# Patient Record
Sex: MALE | Race: WHITE | NOT HISPANIC OR LATINO | Employment: FULL TIME | URBAN - METROPOLITAN AREA
[De-identification: names, ages, dates, MRNs, and addresses within clinical notes are randomized per-mention and may not be internally consistent; named-entity substitution may affect disease eponyms.]

---

## 2017-03-14 ENCOUNTER — OFFICE VISIT (OUTPATIENT)
Dept: URGENT CARE | Facility: RETAIL CLINIC | Age: 37
End: 2017-03-14
Payer: COMMERCIAL

## 2017-03-14 DIAGNOSIS — B07.8 COMMON WART: Primary | ICD-10-CM

## 2017-03-14 PROCEDURE — 99202 OFFICE O/P NEW SF 15 MIN: CPT | Performed by: NURSE PRACTITIONER

## 2017-03-14 PROCEDURE — 17110 DESTRUCTION B9 LES UP TO 14: CPT | Performed by: NURSE PRACTITIONER

## 2017-03-14 NOTE — PROGRESS NOTES
SUBJECTIVE:  Reji Choi is a 36 year old male who presents today for wart treatment.  The patient has had 1 wart(s) for 2 month(s) and has tried over-the counter anti-wart medications.  There is no history of infection or injury.  This is the patient's first treatment.    OBJECTIVE:  The patient appears today in no apparent distress.  Vitals as above.  Skin: A non-erythematous, raised papule with pinpoint hemmorhages measuring 0.5 cm is seen on the foot, left.  A few smaller satellite papules are also noted.    ASSESSMENT: Common Wart(s).    PLAN:  Each wart was frozen easily three times with liquid nitrogen.  Each wart was pared with a #15 blade.  A total of 1 warts are treated today.  The etiology of common warts were discussed.  .name will continue to use the over-the-counter medications such as Compound W on a nightly basis.  Warm soapy water soaks and sanding also recommended.  The patient is to return every two weeks until all warts have been removed.    Trey FORBES, MSN, Family NP-C  [facility]

## 2017-03-14 NOTE — NURSING NOTE
"Chief Complaint   Patient presents with     Wart     left foot for a couple of months       Initial There were no vitals taken for this visit. Estimated body mass index is 29.22 kg/(m^2) as calculated from the following:    Height as of 3/16/12: 5' 10.75\" (1.797 m).    Weight as of 3/16/12: 208 lb (94.3 kg).  Medication Reconciliation: complete   Winnie Cole      "

## 2017-03-14 NOTE — MR AVS SNAPSHOT
"              After Visit Summary   3/14/2017    Reji Choi    MRN: [id number]           Patient Information     Date Of Birth          1980        Visit Information        Provider Department      3/14/2017 5:30 PM Trey Pacheco APRN [facility]        Today's Diagnoses     Common wart    -  1       Follow-ups after your visit        Who to contact     You can reach your care team any time of the day by calling [phone].  Notification of test results:  If you have an abnormal lab result, we will notify you by phone as soon as possible.         Additional Information About Your Visit        MyChart Information     [employer] lets you send messages to your doctor, view your test results, renew your prescriptions, schedule appointments and more. To sign up, go to [email or web address] . Click on \"Log in\" on the left side of the screen, which will take you to the Welcome page. Then click on \"Sign up Now\" on the right side of the page.     You will be asked to enter the access code listed below, as well as some personal information. Please follow the directions to create your username and password.     Your access code is: [email or web address]  Expires: 2017  6:50 PM     Your access code will  in 90 days. If you need help or a new code, please call your [place] clinic or [phone].        Care EveryWhere ID     This is your Care EveryWhere ID. This could be used by other organizations to access your [facility] medical records  [id number]         Blood Pressure from Last 3 Encounters:   10/06/12 145/89   12 132/87   12/15/11 146/92    Weight from Last 3 Encounters:   12 208 lb (94.3 kg)   08 189 lb 9.6 oz (86 kg)   06 183 lb 8 oz (83.2 kg)              Today, you had the following     No orders found for display       Primary Care Provider Office Phone # Fax #    Pedro Barajas [phone]       [facility] [address] [place] [address]        Thank you!     Thank you for choosing [facility]  for your care. Our goal is always to provide you with excellent care. Hearing back from our patients is one way we can continue to improve our services. Please take a few minutes to complete the written survey that you may receive in the mail after your visit with us. Thank you!             Your Updated Medication List - Protect others around you: Learn how to safely use, store and throw away your medicines at www.disposemymeds.org.          This list is accurate as of: 3/14/17  6:50 PM.  Always use your most recent med list.                   Brand Name Dispense Instructions for use    amitriptyline 25 MG tablet    ELAVIL     1 tablet at night       ATORVASTATIN CALCIUM PO          DIOVAN 40 MG tablet   Generic drug:  valsartan      Take 40 mg by mouth daily.       METOPROLOL TARTRATE PO          PRILOSEC PO      1 tablet daily

## 2019-12-03 ENCOUNTER — OFFICE VISIT (OUTPATIENT)
Dept: URGENT CARE | Facility: RETAIL CLINIC | Age: 39
End: 2019-12-03
Payer: COMMERCIAL

## 2019-12-03 VITALS
DIASTOLIC BLOOD PRESSURE: 91 MMHG | HEART RATE: 75 BPM | SYSTOLIC BLOOD PRESSURE: 142 MMHG | TEMPERATURE: 98.2 F | OXYGEN SATURATION: 100 %

## 2019-12-03 DIAGNOSIS — H65.91 OME (OTITIS MEDIA WITH EFFUSION), RIGHT: Primary | ICD-10-CM

## 2019-12-03 DIAGNOSIS — J01.40 ACUTE NON-RECURRENT PANSINUSITIS: ICD-10-CM

## 2019-12-03 PROBLEM — Z79.899 CONTROLLED SUBSTANCE AGREEMENT SIGNED: Status: ACTIVE | Noted: 2018-02-01

## 2019-12-03 PROBLEM — G89.29 CHRONIC NECK PAIN: Status: ACTIVE | Noted: 2017-10-03

## 2019-12-03 PROBLEM — K82.8 BILIARY DYSKINESIA: Status: ACTIVE | Noted: 2017-08-29

## 2019-12-03 PROBLEM — M54.2 CHRONIC NECK PAIN: Status: ACTIVE | Noted: 2017-10-03

## 2019-12-03 PROCEDURE — 99213 OFFICE O/P EST LOW 20 MIN: CPT | Performed by: NURSE PRACTITIONER

## 2019-12-03 RX ORDER — DULOXETIN HYDROCHLORIDE 60 MG/1
60 CAPSULE, DELAYED RELEASE ORAL DAILY
COMMUNITY
End: 2021-08-16

## 2019-12-03 RX ORDER — CHOLESTYRAMINE LIGHT 4 G/5.7G
POWDER, FOR SUSPENSION ORAL
COMMUNITY
Start: 2018-06-11 | End: 2021-08-16 | Stop reason: ALTCHOICE

## 2019-12-03 RX ORDER — LOSARTAN POTASSIUM 100 MG/1
100 TABLET ORAL DAILY
COMMUNITY
Start: 2018-09-14

## 2019-12-03 ASSESSMENT — ENCOUNTER SYMPTOMS
SHORTNESS OF BREATH: 0
COUGH: 0
DIAPHORESIS: 0
WHEEZING: 0
CHEST TIGHTNESS: 0
FATIGUE: 0
FEVER: 0
SORE THROAT: 0
DIZZINESS: 0
SINUS PRESSURE: 1
SLEEP DISTURBANCE: 0
ADENOPATHY: 0
CHILLS: 0
HEADACHES: 1
APPETITE CHANGE: 0
LIGHT-HEADEDNESS: 1
SINUS PAIN: 1
WEAKNESS: 0

## 2019-12-03 NOTE — PATIENT INSTRUCTIONS
Augmentin for right ear infection / perforation and sinusitis.  Tylenol and/or motrin for pain relief and fever reduction.  Warm compresses next to ear for pain relief.  Drink plenty of fluids and place a humidifier in bedroom.  Mucinex to help reduce fluid in ears (guiafenasin is the generic).  Ear infections are not contagious.  Flonase (fluticasone) 2 sprays in each nostril daily until symptoms resolve, then continue 1 spray in each nostril for at least 5 more days.  May use netti pot with bottled or distilled water and saline packets to flush sinuses.  Saline drops or nasal sprays may loosen mucus.  Sit in the bathroom with the door closed and hot shower running to loosen mucus.  Contact primary care clinic if you do not have any relief from your symptoms after 10 days.  Present to emergency room for significantly increasing pain, persistent high fever >102F, swelling/redness around your eyes, changes in your vision or ability to move your eyes, altered mental status or a severe headache.

## 2019-12-03 NOTE — PROGRESS NOTES
Chief Complaint   Patient presents with     Sinus Problem     x a week     Ear Problem     off and on in both ears all week, right ear got worse last night     SUBJECTIVE:  Reji Choi is a 39 year old male who presents for evaluation of sinusitis, facial pain, headache, dizziness, bilateral ear pain, fullness for 1 week(s). Right ear drained thin watery clear yellow yesterday.  Severity: moderate   Timing: gradual onset and worsening  Treatment measures tried include: Tylenol/Ibuprofen.  History of recurrent otitis: no  Predisposing factors include: None.    No past medical history on file.  AMITRIPTYLINE HCL 25 MG OR TABS, 1 tablet at night  ATORVASTATIN CALCIUM PO,   cholestyramine light (PREVALITE) 4 GM powder,   DULoxetine (CYMBALTA) 60 MG capsule, Take 60 mg by mouth daily  losartan (COZAAR) 100 MG tablet, Take 100 mg by mouth  METOPROLOL TARTRATE PO,   omeprazole (PRILOSEC) 20 MG DR capsule,   valsartan (DIOVAN) 40 MG tablet, Take 40 mg by mouth daily.    No current facility-administered medications on file prior to visit.     Social History     Tobacco Use     Smoking status: Current Every Day Smoker     Tobacco comment: eight months ago   Substance Use Topics     Alcohol use: Not on file     Allergies   Allergen Reactions     Clindamycin      Review of Systems   Constitutional: Negative for appetite change, chills, diaphoresis, fatigue and fever.   HENT: Positive for congestion, ear discharge, ear pain, sinus pressure and sinus pain. Negative for postnasal drip and sore throat.    Respiratory: Negative for cough, chest tightness, shortness of breath and wheezing.    Skin: Negative for pallor and rash.   Neurological: Positive for light-headedness and headaches. Negative for dizziness and weakness.   Hematological: Negative for adenopathy.   Psychiatric/Behavioral: Negative for sleep disturbance.     OBJECTIVE:  BP (!) 142/91   Pulse 75   Temp 98.2  F (36.8  C) (Oral)   SpO2 100%      Physical  Exam  Vitals signs reviewed.   Constitutional:       Appearance: Normal appearance.   HENT:      Head: Normocephalic and atraumatic.      Left Ear: Tympanic membrane and ear canal normal.      Ears:      Comments: Right tympanic membrane bulging erythematous, cannot see perforation. Suspect minor perf with thin clear yellow drainage yesterday.     Nose: Congestion present.      Comments: pansinus tenderness  Cardiovascular:      Rate and Rhythm: Normal rate.   Pulmonary:      Effort: Pulmonary effort is normal.   Skin:     General: Skin is warm and dry.   Neurological:      General: No focal deficit present.      Mental Status: He is alert and oriented to person, place, and time.   Psychiatric:         Mood and Affect: Mood normal.         Behavior: Behavior normal.       ASSESSMENT:    ICD-10-CM    1. OME (otitis media with effusion), right H65.91 amoxicillin-clavulanate (AUGMENTIN) 875-125 MG tablet   2. Acute non-recurrent pansinusitis J01.40 amoxicillin-clavulanate (AUGMENTIN) 875-125 MG tablet     PLAN:   Patient Instructions   Augmentin for right ear infection / perforation and sinusitis.  Tylenol and/or motrin for pain relief and fever reduction.  Warm compresses next to ear for pain relief.  Drink plenty of fluids and place a humidifier in bedroom.  Mucinex to help reduce fluid in ears (guiafenasin is the generic).  Ear infections are not contagious.  Flonase (fluticasone) 2 sprays in each nostril daily until symptoms resolve, then continue 1 spray in each nostril for at least 5 more days.  May use netti pot with bottled or distilled water and saline packets to flush sinuses.  Saline drops or nasal sprays may loosen mucus.  Sit in the bathroom with the door closed and hot shower running to loosen mucus.  Contact primary care clinic if you do not have any relief from your symptoms after 10 days.  Present to emergency room for significantly increasing pain, persistent high fever >102F, swelling/redness around  your eyes, changes in your vision or ability to move your eyes, altered mental status or a severe headache.    Follow up with primary care provider with any problems, questions or concerns or if symptoms worsen or fail to improve. Patient agreed to plan and verbalized understanding.    LIS Townsend-BC  [facility] [place]

## 2020-01-28 ENCOUNTER — OFFICE VISIT (OUTPATIENT)
Dept: URGENT CARE | Facility: RETAIL CLINIC | Age: 40
End: 2020-01-28
Payer: COMMERCIAL

## 2020-01-28 ENCOUNTER — TELEPHONE (OUTPATIENT)
Dept: FAMILY MEDICINE | Facility: CLINIC | Age: 40
End: 2020-01-28

## 2020-01-28 VITALS
DIASTOLIC BLOOD PRESSURE: 69 MMHG | SYSTOLIC BLOOD PRESSURE: 129 MMHG | OXYGEN SATURATION: 97 % | HEART RATE: 77 BPM | TEMPERATURE: 98.7 F

## 2020-01-28 DIAGNOSIS — H65.93 OME (OTITIS MEDIA WITH EFFUSION), BILATERAL: Primary | ICD-10-CM

## 2020-01-28 DIAGNOSIS — R05.9 COUGH: ICD-10-CM

## 2020-01-28 LAB
FLUAV AG UPPER RESP QL IA.RAPID: NORMAL
FLUBV AG UPPER RESP QL IA.RAPID: NORMAL

## 2020-01-28 PROCEDURE — 99213 OFFICE O/P EST LOW 20 MIN: CPT | Performed by: NURSE PRACTITIONER

## 2020-01-28 PROCEDURE — 87804 INFLUENZA ASSAY W/OPTIC: CPT | Mod: QW | Performed by: NURSE PRACTITIONER

## 2020-01-28 RX ORDER — CEFDINIR 300 MG/1
300 CAPSULE ORAL 2 TIMES DAILY
Qty: 14 CAPSULE | Refills: 0 | Status: SHIPPED | OUTPATIENT
Start: 2020-01-28 | End: 2020-02-04

## 2020-01-28 ASSESSMENT — ENCOUNTER SYMPTOMS
WHEEZING: 0
EYE PAIN: 0
COUGH: 1
EYE ITCHING: 0
STRIDOR: 0
NAUSEA: 1
FEVER: 1
MYALGIAS: 1
HEADACHES: 1
WEAKNESS: 0
PHOTOPHOBIA: 0
EYE REDNESS: 0
ABDOMINAL PAIN: 0
SHORTNESS OF BREATH: 0
FATIGUE: 0
DIZZINESS: 0
CHEST TIGHTNESS: 0
SLEEP DISTURBANCE: 1
CHILLS: 1
SORE THROAT: 0
APPETITE CHANGE: 0
RHINORRHEA: 0
VOMITING: 0
DIAPHORESIS: 1
EYE DISCHARGE: 0

## 2020-01-29 NOTE — PATIENT INSTRUCTIONS
Omnicef for lingering ear infections.  Antihistamines zytrec and benadryl for ear fullness.  Tylenol and/or motrin for pain relief and fever reduction.  Warm compresses next to ear for pain relief.  Drink plenty of fluids and place a humidifier in bedroom.  Ear infections are not contagious.  Swimming is ok as long as there is no perforation in the ear drum.   Follow up with primary care provider 10-14 days after starting the antibiotic with any concern of persistent infection.    Flu test was negative.  Rest! Your body needs more rest to heal.  Drink plenty of fluids (warm fluids like tea or soup are soothing and reduce cough)  Sit in the bathroom with a hot shower running and breathe in the steam.  Honey may soothe your sore throat and help manage your cough- may take straight or in warm water with lemon juice.  Avoid smoke (cigarettes, bonfires, fireplace, wood burning stoves).  Take Tylenol or an NSAID such as ibuprofen or naproxen as needed for pain.  Delsym (dextromethorphan polistirex) is an over the counter cough medication that lasts 12 hours.   Mucinex or Robitussin (guiafenesin) thin mucus and may help it to loosen more quickly  Good handwashing is the best way to prevent spread of germs  Present to emergency room if you develop trouble breathing, swallowing or cough-up blood.  Follow up with your primary care provider if symptoms worsen or fail to improve as expected.

## 2020-01-29 NOTE — PROGRESS NOTES
Chief Complaint   Patient presents with     Cough     x 5 days     Fever     SUBJECTIVE:  Reji Choi is a 39 year old male who presents to the clinic today with his girlfriend with a chief complaint of cough, fever, headache, myalgias, nausea for 5 days. Earaches for 2 months. Treated AOM with Augmentin 12/03.  His cough is described as occasional and productive of yellow sputum.  The patient's symptoms are moderate and stable.  The patient's symptoms are exacerbated by no particular triggers.  Patient has been using ibuprofen and Tylenol to improve symptoms.  Predisposing factors include: dad in the hospital with flu.    No past medical history on file.  AMITRIPTYLINE HCL 25 MG OR TABS, 1 tablet at night  ATORVASTATIN CALCIUM PO,   cholestyramine light (PREVALITE) 4 GM powder,   DULoxetine (CYMBALTA) 60 MG capsule, Take 60 mg by mouth daily  losartan (COZAAR) 100 MG tablet, Take 100 mg by mouth  METOPROLOL TARTRATE PO,   omeprazole (PRILOSEC) 20 MG DR capsule,   valsartan (DIOVAN) 40 MG tablet, Take 40 mg by mouth daily.    No current facility-administered medications on file prior to visit.     Social History     Tobacco Use     Smoking status: Current Every Day Smoker     Tobacco comment: eight months ago   Substance Use Topics     Alcohol use: Not on file     Allergies   Allergen Reactions     Clindamycin      Review of Systems   Constitutional: Positive for chills, diaphoresis and fever. Negative for appetite change and fatigue.   HENT: Positive for congestion and ear pain. Negative for ear discharge, rhinorrhea, sneezing and sore throat.    Eyes: Negative for photophobia, pain, discharge, redness, itching and visual disturbance.   Respiratory: Positive for cough. Negative for chest tightness, shortness of breath, wheezing and stridor.    Gastrointestinal: Positive for nausea. Negative for abdominal pain and vomiting.   Musculoskeletal: Positive for myalgias.   Skin: Negative for rash.    Allergic/Immunologic: Negative for environmental allergies.   Neurological: Positive for headaches. Negative for dizziness and weakness.   Psychiatric/Behavioral: Positive for sleep disturbance.     /69   Pulse 77   Temp 98.7  F (37.1  C) (Oral)   SpO2 97%     Physical Exam  Vitals signs reviewed.   Constitutional:       Appearance: Normal appearance. He is ill-appearing. He is not toxic-appearing.   HENT:      Head: Normocephalic and atraumatic.      Ears:      Comments: Bilateral tympanic membranes with serosanguinous effusions, bulging, erythema.     Nose: Nose normal.      Mouth/Throat:      Mouth: Mucous membranes are moist.      Pharynx: No oropharyngeal exudate or posterior oropharyngeal erythema.   Neck:      Musculoskeletal: Normal range of motion and neck supple.   Cardiovascular:      Rate and Rhythm: Normal rate.   Pulmonary:      Effort: Respiratory distress (occasional cough) present.      Breath sounds: Normal breath sounds. No stridor. No wheezing, rhonchi or rales.   Chest:      Chest wall: No tenderness.   Abdominal:      General: Abdomen is flat.      Palpations: Abdomen is soft.   Musculoskeletal: Normal range of motion.   Lymphadenopathy:      Cervical: No cervical adenopathy.   Skin:     General: Skin is warm and dry.      Findings: No rash.   Neurological:      General: No focal deficit present.      Mental Status: He is alert and oriented to person, place, and time.   Psychiatric:         Mood and Affect: Mood normal.         Behavior: Behavior normal.       ASSESSMENT:    ICD-10-CM    1. OME (otitis media with effusion), bilateral H65.93 cefdinir (OMNICEF) 300 MG capsule   2. Cough R05 INFLUENZA A/B ANTIGEN     PLAN:   Patient Instructions   Omnicef for lingering ear infections.  Antihistamines zytrec and benadryl for ear fullness.  Tylenol and/or motrin for pain relief and fever reduction.  Warm compresses next to ear for pain relief.  Drink plenty of fluids and place a  humidifier in bedroom.  Ear infections are not contagious.  Swimming is ok as long as there is no perforation in the ear drum.   Follow up with primary care provider 10-14 days after starting the antibiotic with any concern of persistent infection.    Flu test was negative.  Rest! Your body needs more rest to heal.  Drink plenty of fluids (warm fluids like tea or soup are soothing and reduce cough)  Sit in the bathroom with a hot shower running and breathe in the steam.  Honey may soothe your sore throat and help manage your cough- may take straight or in warm water with lemon juice.  Avoid smoke (cigarettes, bonfires, fireplace, wood burning stoves).  Take Tylenol or an NSAID such as ibuprofen or naproxen as needed for pain.  Delsym (dextromethorphan polistirex) is an over the counter cough medication that lasts 12 hours.   Mucinex or Robitussin (guiafenesin) thin mucus and may help it to loosen more quickly  Good handwashing is the best way to prevent spread of germs  Present to emergency room if you develop trouble breathing, swallowing or cough-up blood.  Follow up with your primary care provider if symptoms worsen or fail to improve as expected.    Follow up with primary care provider with any problems, questions or concerns or if symptoms worsen or fail to improve. Patient agreed to plan and verbalized understanding.    Josselin Rivera, LIS-BC  [facility] [place]

## 2021-08-04 ENCOUNTER — HOSPITAL ENCOUNTER (EMERGENCY)
Facility: CLINIC | Age: 41
Discharge: HOME | End: 2021-08-04
Attending: EMERGENCY MEDICINE | Admitting: EMERGENCY MEDICINE
Payer: COMMERCIAL

## 2021-08-04 VITALS
BODY MASS INDEX: 25.48 KG/M2 | SYSTOLIC BLOOD PRESSURE: 145 MMHG | HEART RATE: 57 BPM | OXYGEN SATURATION: 99 % | DIASTOLIC BLOOD PRESSURE: 97 MMHG | RESPIRATION RATE: 16 BRPM | WEIGHT: 181.4 LBS | TEMPERATURE: 97.9 F

## 2021-08-04 DIAGNOSIS — H92.01 OTALGIA, RIGHT: ICD-10-CM

## 2021-08-04 PROCEDURE — 99282 EMERGENCY DEPT VISIT SF MDM: CPT | Performed by: EMERGENCY MEDICINE

## 2021-08-04 PROCEDURE — 99284 EMERGENCY DEPT VISIT MOD MDM: CPT | Performed by: EMERGENCY MEDICINE

## 2021-08-04 RX ORDER — HYDROCODONE BITARTRATE AND ACETAMINOPHEN 5; 325 MG/1; MG/1
1 TABLET ORAL EVERY 4 HOURS PRN
Qty: 15 TABLET | Refills: 0 | Status: SHIPPED | OUTPATIENT
Start: 2021-08-04 | End: 2021-08-16

## 2021-08-04 NOTE — DISCHARGE INSTRUCTIONS
Use Zyrtec for allergies.  Follow-up with ENT as directed.  I put in a referral for you.  Return to the emergency department if you develop new or worsening symptoms.  There was no infection there but there appeared to be a large amount of effusion or fluid in the middle ear.  You may need to have an ear tube.  He will be able to discuss this possibility with ENT.  Do not drive if taking the pain pills.

## 2021-08-04 NOTE — ED PROVIDER NOTES
History     Chief Complaint   Patient presents with     Otalgia     HPI  Reji Choi is a 41 year old male who presents to the emergency department secondary to right ear pain.  The pain started yesterday and is associated with some dizziness for a couple of weeks and drainage from his eye that started today as well.  He has not been sick recently.  He has had decreased hearing in the right ear.  He intermittently gets some mild dizziness.  He had a ruptured eardrum previously that resulted in a lot of pain and at the time his eardrum was infected.    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 No pertinent past medical history.    Past Surgical History:    Past Surgical History:   Procedure Laterality Date     CHOLECYSTECTOMY         Family History:    History reviewed. No pertinent family history.    Social History:  Marital Status:   [2]  Social History     Tobacco Use     Smoking status: Former Smoker     Smokeless tobacco: Never Used     Tobacco comment: eight months ago   Substance Use Topics     Alcohol use: Yes     Comment: occ     Drug use: Yes     Types: Marijuana      Comment: prescription         Medications:    HYDROcodone-acetaminophen (NORCO) 5-325 MG tablet  AMITRIPTYLINE HCL 25 MG OR TABS  ATORVASTATIN CALCIUM PO  cholestyramine light (PREVALITE) 4 GM powder  DULoxetine (CYMBALTA) 60 MG capsule  losartan (COZAAR) 100 MG tablet  METOPROLOL TARTRATE PO  omeprazole (PRILOSEC) 20 MG DR capsule  valsartan (DIOVAN) 40 MG tablet          Review of Systems   All other systems reviewed and are negative.      Physical Exam   BP: (!) 145/97  Pulse: 57  Temp: 97.9  F (36.6  C)  Resp: 16  Weight: 82.3 kg (181 lb 6.4 oz)  SpO2: 99 %      Physical Exam  Vitals and nursing note reviewed.   Constitutional:       General: He is not in acute distress.     Appearance: He is well-developed. He is not diaphoretic.   HENT:      Head: Normocephalic and atraumatic.      Right Ear: Ear canal and external ear normal. There is no impacted cerumen.      Left Ear: Ear canal and external ear normal. There is no impacted cerumen.      Ears:      Comments: Middle ear effusion on the right with opacity of the tympanic membrane.  Left tympanic membrane appears normal.  No erythema of either tympanic membrane.     Nose: Nose normal.      Mouth/Throat:      Mouth: Mucous membranes are moist.      Pharynx: Oropharynx is clear. No oropharyngeal exudate or posterior oropharyngeal erythema.   Eyes:      General: No scleral icterus.     Extraocular Movements: Extraocular movements intact.      Conjunctiva/sclera: Conjunctivae normal.      Pupils: Pupils are equal, round, and reactive to light.   Cardiovascular:      Rate and Rhythm: Normal rate.   Pulmonary:      Effort: Pulmonary effort is normal.   Musculoskeletal:         General: Normal range of motion.      Cervical back: Normal range of motion and neck supple.   Skin:     General: Skin is warm and dry.      Findings: No rash.   Neurological:      Mental Status: He is alert and oriented to person, place, and time.   Psychiatric:         Mood and Affect: Mood  normal.         Thought Content: Thought content normal.         ED Course        Procedures                  No results found for this or any previous visit (from the past 24 hour(s)).    Medications - No data to display    Assessments & Plan (with Medical Decision Making)  Middle ear effusion, right ear in the setting of previous history of tympanic membrane rupture.  This is probably the cause of his dizziness and decreased hearing.  I have referred him to ENT.  I am not sure if he would benefit from a PE tube on that side.  Return to ER precautions and follow-up precautions discussed.  He can take Zyrtec for allergies and this may help.  He cannot use Flonase as he has a sensitivity to that.     I have reviewed the nursing notes.    I have reviewed the findings, diagnosis, plan and need for follow up with the patient.      New Prescriptions    HYDROCODONE-ACETAMINOPHEN (NORCO) 5-325 MG TABLET    Take 1 tablet by mouth every 4 hours as needed for pain       Final diagnoses:   Otalgia, right       8/4/2021   [facility] EMERGENCY DEPT     Raul Woodward MD  08/04/21 [id number]

## 2021-08-04 NOTE — ED TRIAGE NOTES
Pt having pain in the right ear since yesterday, reports some dizziness for a couple of weeks and drainage from his eye today as well

## 2021-08-10 NOTE — PROGRESS NOTES
ENT Consultation    Reji Choi who is a 41 year old male seen in consultation at the request of Raul Woodward.      History of Present Illness - Reji Choi is a 41 year old male who presents at the request of emergency department.  He presents with pain in his right ear.  Actually he has had discomfort in both ears for at least few weeks.  This particular pain is just much more excruciating on the right side associated with some dizziness disequilibrium.  He says both ears feel somewhat plugged more pain on the right side.  He also gets pain from his neck to the back of the head.  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  Has not had it in a while.  And his dizziness appears to be more lightheadedness rather than vertigo.  He says he does better in the light environment rather than dark environment.  Patient does wear oral appliance at night.  Of interest is that the problems exacerbated after he got new appliance.  He wears appliance because of bruxism.    Body mass index is 25.33 kg/m .    Weight management plan: Patient was referred to their PCP to discuss a diet and exercise plan.    BP Readings from Last 1 Encounters:   08/16/21 (!) 140/86       BP noted to be elevated today in office.  Patient to follow up with Primary Care provider regarding elevated blood pressure.    Reji IS NOT a smoker/uses chewing tobacco.    Past Medical History - History reviewed. No pertinent past medical history.    Current Medications -   Current Outpatient Medications:      desonide (DESOWEN) 0.05 % external cream, , Disp: , Rfl:      escitalopram (LEXAPRO) 20 MG tablet, Take 20 mg by mouth, Disp: , Rfl:      losartan (COZAAR) 100 MG tablet, Take 100 mg by mouth, Disp: , Rfl:      METOPROLOL TARTRATE PO, Take 200 mg by mouth , Disp: , Rfl:      omeprazole (PRILOSEC) 20 MG DR capsule, , Disp: , Rfl:      colestipol (COLESTID) 1 g tablet, TAKE 2 TABLETS BY MOUTH TWICE A DAY, Disp: ,  Rfl:      MELATONIN TR PO, , Disp: , Rfl:     Allergies -   Allergies   Allergen Reactions     Clindamycin      Pravastatin Other (See Comments)     Rosuvastatin Muscle Pain (Myalgia)     Simvastatin Muscle Pain (Myalgia)       Social History -   Social History     Socioeconomic History     Marital status:      Spouse name: Not on file     Number of children: Not on file     Years of education: Not on file     Highest education level: Not on file   Occupational History     Not on file   Tobacco Use     Smoking status: Former Smoker     Smokeless tobacco: Never Used     Tobacco comment: eight months ago   Substance and Sexual Activity     Alcohol use: Yes     Comment: occ     Drug use: Yes     Types: Marijuana     Comment: prescription      Sexual activity: Not Currently   Other Topics Concern     Not on file   Social History Narrative     Not on file     Social Determinants of Health     Financial Resource Strain:      Difficulty of Paying Living Expenses:    Food Insecurity:      Worried About Running Out of Food in the Last Year:      Ran Out of Food in the Last Year:    Transportation Needs:      Lack of Transportation (Medical):      Lack of Transportation (Non-Medical):    Physical Activity:      Days of Exercise per Week:      Minutes of Exercise per Session:    Stress:      Feeling of Stress :    Social Connections:      Frequency of Communication with Friends and Family:      Frequency of Social Gatherings with Friends and Family:      Attends Spiritism Services:      Active Member of Clubs or Organizations:      Attends Club or Organization Meetings:      Marital Status:    Intimate Partner Violence:      Fear of Current or Ex-Partner:      Emotionally Abused:      Physically Abused:      Sexually Abused:        Family History - History reviewed. No pertinent family history.    Review of Systems - As per HPI and PMHx, otherwise review of system review of the head and neck negative. Otherwise 10+  "review of system is negative    Physical Exam  BP (!) 140/86   Temp 97.7  F (36.5  C) (Temporal)   Ht 1.778 m (5' 10\")   Wt 80.1 kg (176 lb 8 oz)   BMI 25.33 kg/m    BMI: Body mass index is 25.33 kg/m .    General - The patient is well nourished and well developed, and appears to have good nutritional status.  Alert and oriented to person and place, answers questions and cooperates with examination appropriately.    SKIN - No suspicious lesions or rashes.  Respiration - No respiratory distress.  Head and Face - Normocephalic and atraumatic, with no gross asymmetry noted of the contour of the facial features.  The facial nerve is intact, with strong symmetric movements.    Voice and Breathing - The patient was breathing comfortably without the use of accessory muscles. The patients voice was clear and strong, and had appropriate pitch and quality.    Ears - Bilateral pinna and EACs with normal appearing overlying skin. Tympanic membrane intact with good mobility on pneumatic otoscopy bilaterally. Bony landmarks of the ossicular chain are normal. The tympanic membranes are normal in appearance. No retraction, perforation, or masses.  No fluid or purulence was seen in the external canal or the middle ear.     Eyes - Extraocular movements intact.  Sclera were not icteric or injected, conjunctiva were pink and moist.    Mouth - Examination of the oral cavity showed pink, healthy oral mucosa. No lesions or ulcerations noted.  The tongue was mobile and midline, and the dentition were in fair condition.  There were signs of bruxism with erosion of the enamel of the lower front teeth.    Throat - The walls of the oropharynx were smooth, pink, moist, symmetric, and had no lesions or ulcerations.  The tonsillar pillars and soft palate were symmetric.  The uvula was midline on elevation.  Tenderness palpation of the temporomandibular space on the right side.    Neck - Normal midline excursion of the laryngotracheal complex " during swallowing.  Full range of motion on passive movement.  Palpation of the occipital, submental, submandibular, internal jugular chain, and supraclavicular nodes did not demonstrate any abnormal lymph nodes or masses.  The carotid pulse was palpable bilaterally.  Palpation of the thyroid was soft and smooth, with no nodules or goiter appreciated.  The trachea was mobile and midline.  There is definite tenderness on palpation of the sternocleidomastoid on the right side.  There is definite tenderness on palpation of the TMJ area on the right side.    Nose - External contour is symmetric, no gross deflection or scars.  Nasal mucosa is pink and moist with no abnormal mucus.  The septum was midline and non-obstructive, turbinates of normal size and position.  No polyps, masses, or purulence noted on examination.    Neuro - Nonfocal neuro exam is normal, CN 2 through 12 intact, normal gait and muscle tone.      Performed in clinic today:  Attempts at mirror laryngoscopy were not possible due to gag reflex.  Therefore I proceeded with a fiberoptic examination.  First I sprayed both sides of the nose with a mixture of lidocaine and neosynephrine.  I then passed the scope through the nasal cavity.  The nasal cavity was unremarkable.  The nasopharynx was mucosally covered and symmetric.  The Eustachian tube openings were unobstructed.  Going further down I had a clear view of the base of tongue which had normal appearing lingual tonsillar tissue.  The base of tongue was free of lesions, and the vallecula was open.  The epiglottis was smooth and mucosally covered.  The supraglottic larynx was then clearly visualized.  The vocal cords moved smoothly and symmetrically, they were pearly white and no lesions were seen.  The pyriform sinuses were open, and the limited view of the postcricoid region did not show any lesions. Ambu disposable scope.      A/P - Reji Choi is a 41 year old male with right otalgia cervicalgia  history of ocular migraines disequilibrium possibly related to current inflammatory temporomandibular space issue.  At this point considering ibuprofen is not extremely helpful we advised topical heat and Medrol Dosepak will be given to reduce inflammation acutely.  He will see his orthodontist to talk about appliance and potential problems associated with it leading to some of current musculoskeletal picture.  Patient will see me back in a few weeks and audiogram will be obtained since he also complains that gradually his hearing has declined and he was experiencing intermittent tinnitus bilaterally nonpulsatile.      Derrick Valle MD

## 2021-08-16 ENCOUNTER — OFFICE VISIT (OUTPATIENT)
Dept: OTOLARYNGOLOGY | Facility: CLINIC | Age: 41
End: 2021-08-16
Attending: EMERGENCY MEDICINE
Payer: COMMERCIAL

## 2021-08-16 VITALS
TEMPERATURE: 97.7 F | SYSTOLIC BLOOD PRESSURE: 140 MMHG | WEIGHT: 176.5 LBS | BODY MASS INDEX: 25.27 KG/M2 | HEIGHT: 70 IN | DIASTOLIC BLOOD PRESSURE: 86 MMHG

## 2021-08-16 DIAGNOSIS — M26.609 TMJ (TEMPOROMANDIBULAR JOINT SYNDROME): Primary | ICD-10-CM

## 2021-08-16 DIAGNOSIS — M54.2 CERVICALGIA: ICD-10-CM

## 2021-08-16 DIAGNOSIS — H93.13 TINNITUS, BILATERAL: ICD-10-CM

## 2021-08-16 DIAGNOSIS — H92.01 OTALGIA, RIGHT: ICD-10-CM

## 2021-08-16 PROCEDURE — 99203 OFFICE O/P NEW LOW 30 MIN: CPT | Mod: 25 | Performed by: OTOLARYNGOLOGY

## 2021-08-16 PROCEDURE — 31575 DIAGNOSTIC LARYNGOSCOPY: CPT | Performed by: OTOLARYNGOLOGY

## 2021-08-16 RX ORDER — ESCITALOPRAM OXALATE 20 MG/1
20 TABLET ORAL DAILY
COMMUNITY
Start: 2020-10-02

## 2021-08-16 RX ORDER — DESONIDE 0.5 MG/G
CREAM TOPICAL 2 TIMES DAILY PRN
COMMUNITY
Start: 2020-10-02

## 2021-08-16 RX ORDER — METHYLPREDNISOLONE 4 MG
TABLET, DOSE PACK ORAL
Qty: 21 TABLET | Refills: 0 | Status: SHIPPED | OUTPATIENT
Start: 2021-08-16 | End: 2021-08-30

## 2021-08-16 RX ORDER — MONTELUKAST SODIUM 4 MG/1
2 TABLET, CHEWABLE ORAL 2 TIMES DAILY
COMMUNITY
Start: 2021-06-17

## 2021-08-16 ASSESSMENT — MIFFLIN-ST. JEOR: SCORE: 1701.85

## 2021-08-16 ASSESSMENT — PAIN SCALES - GENERAL: PAINLEVEL: SEVERE PAIN (7)

## 2021-08-16 NOTE — LETTER
8/16/2021         RE: Reji Choi  [id number] [facility] [id number]        Dear Colleague,    Thank you for referring your patient, Reji Choi, to the [facility]. Please see a copy of my visit note below.    ENT Consultation    Reji Choi who is a 41 year old male seen in consultation at the request of Raul Woodward.      History of Present Illness - Reji Choi is a 41 year old male who presents at the request of emergency department.  He presents with pain in his right ear.  Actually he has had discomfort in both ears for at least few weeks.  This particular pain is just much more excruciating on the right side associated with some dizziness disequilibrium.  He says both ears feel somewhat plugged more pain on the right side.  He also gets pain from his neck to the back of the head.  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  Has not had it in a while.  And his dizziness appears to be more lightheadedness rather than vertigo.  He says he does better in the light environment rather than dark environment.  Patient does wear oral appliance at night.  Of interest is that the problems exacerbated after he got new appliance.  He wears appliance because of bruxism.    Body mass index is 25.33 kg/m .    Weight management plan: Patient was referred to their PCP to discuss a diet and exercise plan.    BP Readings from Last 1 Encounters:   08/16/21 (!) 140/86       BP noted to be elevated today in office.  Patient to follow up with Primary Care provider regarding elevated blood pressure.    Reji IS NOT a smoker/uses chewing tobacco.    Past Medical History - History reviewed. No pertinent past medical history.    Current Medications -   Current Outpatient Medications:      desonide (DESOWEN) 0.05 % external cream, , Disp: , Rfl:      escitalopram (LEXAPRO) 20 MG tablet, Take 20 mg by mouth, Disp: , Rfl:      losartan (COZAAR) 100 MG  tablet, Take 100 mg by mouth, Disp: , Rfl:      METOPROLOL TARTRATE PO, Take 200 mg by mouth , Disp: , Rfl:      omeprazole (PRILOSEC) 20 MG DR capsule, , Disp: , Rfl:      colestipol (COLESTID) 1 g tablet, TAKE 2 TABLETS BY MOUTH TWICE A DAY, Disp: , Rfl:      MELATONIN TR PO, , Disp: , Rfl:     Allergies -   Allergies   Allergen Reactions     Clindamycin      Pravastatin Other (See Comments)     Rosuvastatin Muscle Pain (Myalgia)     Simvastatin Muscle Pain (Myalgia)       Social History -   Social History     Socioeconomic History     Marital status:      Spouse name: Not on file     Number of children: Not on file     Years of education: Not on file     Highest education level: Not on file   Occupational History     Not on file   Tobacco Use     Smoking status: Former Smoker     Smokeless tobacco: Never Used     Tobacco comment: eight months ago   Substance and Sexual Activity     Alcohol use: Yes     Comment: occ     Drug use: Yes     Types: Marijuana     Comment: prescription      Sexual activity: Not Currently   Other Topics Concern     Not on file   Social History Narrative     Not on file     Social Determinants of Health     Financial Resource Strain:      Difficulty of Paying Living Expenses:    Food Insecurity:      Worried About Running Out of Food in the Last Year:      Ran Out of Food in the Last Year:    Transportation Needs:      Lack of Transportation (Medical):      Lack of Transportation (Non-Medical):    Physical Activity:      Days of Exercise per Week:      Minutes of Exercise per Session:    Stress:      Feeling of Stress :    Social Connections:      Frequency of Communication with Friends and Family:      Frequency of Social Gatherings with Friends and Family:      Attends Mu-ism Services:      Active Member of Clubs or Organizations:      Attends Club or Organization Meetings:      Marital Status:    Intimate Partner Violence:      Fear of Current or Ex-Partner:      Emotionally  "Abused:      Physically Abused:      Sexually Abused:        Family History - History reviewed. No pertinent family history.    Review of Systems - As per HPI and PMHx, otherwise review of system review of the head and neck negative. Otherwise 10+ review of system is negative    Physical Exam  BP (!) 140/86   Temp 97.7  F (36.5  C) (Temporal)   Ht 1.778 m (5' 10\")   Wt 80.1 kg (176 lb 8 oz)   BMI 25.33 kg/m    BMI: Body mass index is 25.33 kg/m .    General - The patient is well nourished and well developed, and appears to have good nutritional status.  Alert and oriented to person and place, answers questions and cooperates with examination appropriately.    SKIN - No suspicious lesions or rashes.  Respiration - No respiratory distress.  Head and Face - Normocephalic and atraumatic, with no gross asymmetry noted of the contour of the facial features.  The facial nerve is intact, with strong symmetric movements.    Voice and Breathing - The patient was breathing comfortably without the use of accessory muscles. The patients voice was clear and strong, and had appropriate pitch and quality.    Ears - Bilateral pinna and EACs with normal appearing overlying skin. Tympanic membrane intact with good mobility on pneumatic otoscopy bilaterally. Bony landmarks of the ossicular chain are normal. The tympanic membranes are normal in appearance. No retraction, perforation, or masses.  No fluid or purulence was seen in the external canal or the middle ear.     Eyes - Extraocular movements intact.  Sclera were not icteric or injected, conjunctiva were pink and moist.    Mouth - Examination of the oral cavity showed pink, healthy oral mucosa. No lesions or ulcerations noted.  The tongue was mobile and midline, and the dentition were in fair condition.  There were signs of bruxism with erosion of the enamel of the lower front teeth.    Throat - The walls of the oropharynx were smooth, pink, moist, symmetric, and had no " lesions or ulcerations.  The tonsillar pillars and soft palate were symmetric.  The uvula was midline on elevation.  Tenderness palpation of the temporomandibular space on the right side.    Neck - Normal midline excursion of the laryngotracheal complex during swallowing.  Full range of motion on passive movement.  Palpation of the occipital, submental, submandibular, internal jugular chain, and supraclavicular nodes did not demonstrate any abnormal lymph nodes or masses.  The carotid pulse was palpable bilaterally.  Palpation of the thyroid was soft and smooth, with no nodules or goiter appreciated.  The trachea was mobile and midline.  There is definite tenderness on palpation of the sternocleidomastoid on the right side.  There is definite tenderness on palpation of the TMJ area on the right side.    Nose - External contour is symmetric, no gross deflection or scars.  Nasal mucosa is pink and moist with no abnormal mucus.  The septum was midline and non-obstructive, turbinates of normal size and position.  No polyps, masses, or purulence noted on examination.    Neuro - Nonfocal neuro exam is normal, CN 2 through 12 intact, normal gait and muscle tone.      Performed in clinic today:  Attempts at mirror laryngoscopy were not possible due to gag reflex.  Therefore I proceeded with a fiberoptic examination.  First I sprayed both sides of the nose with a mixture of lidocaine and neosynephrine.  I then passed the scope through the nasal cavity.  The nasal cavity was unremarkable.  The nasopharynx was mucosally covered and symmetric.  The Eustachian tube openings were unobstructed.  Going further down I had a clear view of the base of tongue which had normal appearing lingual tonsillar tissue.  The base of tongue was free of lesions, and the vallecula was open.  The epiglottis was smooth and mucosally covered.  The supraglottic larynx was then clearly visualized.  The vocal cords moved smoothly and symmetrically, they  were pearly white and no lesions were seen.  The pyriform sinuses were open, and the limited view of the postcricoid region did not show any lesions. Ambu disposable scope.      A/P - Reji Choi is a 41 year old male with right otalgia cervicalgia history of ocular migraines disequilibrium possibly related to current inflammatory temporomandibular space issue.  At this point considering ibuprofen is not extremely helpful we advised topical heat and Medrol Dosepak will be given to reduce inflammation acutely.  He will see his orthodontist to talk about appliance and potential problems associated with it leading to some of current musculoskeletal picture.  Patient will see me back in a few weeks and audiogram will be obtained since he also complains that gradually his hearing has declined and he was experiencing intermittent tinnitus bilaterally nonpulsatile.      Derrick Valle MD        Again, thank you for allowing me to participate in the care of your patient.        Sincerely,        Derrick Valle MD, MD

## 2021-08-23 NOTE — PROGRESS NOTES
History of Present Illness - Reji Choi is a 41 year old male presenting in clinic today for a recheck on Patient presents with:  RECHECK    Patient presents in follow-up after his initial presentation with severe right-sided otalgia that was felt to be musculoskeletal/TMJ in origin.  Medrol Dosepak that he tried apparently given the low-grade fever did not help and he thinks a lot of ibuprofen use local heat in the right ear is better.  Now he started having normal on the left the same severe pain sharp pain going towards the angle of the jaw and the neck.  Patient never had a chance to see orthodontist anybody about his occlusion causing and contributing to the issue.    He denies any discharge he denies any changes in hearing.    BP Readings from Last 1 Encounters:   08/30/21 (!) 158/98       BP noted to be elevated today in office.  Patient to follow up with Primary Care provider regarding elevated blood pressure.    Reji IS NOT a smoker/uses chewing tobacco.    Past Medical History - History reviewed. No pertinent past medical history.    Current Medications -   Current Outpatient Medications:      colestipol (COLESTID) 1 g tablet, TAKE 2 TABLETS BY MOUTH TWICE A DAY, Disp: , Rfl:      desonide (DESOWEN) 0.05 % external cream, , Disp: , Rfl:      escitalopram (LEXAPRO) 20 MG tablet, Take 20 mg by mouth, Disp: , Rfl:      losartan (COZAAR) 100 MG tablet, Take 100 mg by mouth, Disp: , Rfl:      MELATONIN TR PO, , Disp: , Rfl:      METOPROLOL TARTRATE PO, Take 200 mg by mouth , Disp: , Rfl:      omeprazole (PRILOSEC) 20 MG DR capsule, , Disp: , Rfl:     Allergies -   Allergies   Allergen Reactions     Clindamycin      Pravastatin Other (See Comments)     Rosuvastatin Muscle Pain (Myalgia)     Simvastatin Muscle Pain (Myalgia)       Social History -   Social History     Socioeconomic History     Marital status:      Spouse name: Not on file     Number of children: Not on file     Years of education: Not  "on file     Highest education level: Not on file   Occupational History     Not on file   Tobacco Use     Smoking status: Former Smoker     Smokeless tobacco: Never Used     Tobacco comment: eight months ago   Substance and Sexual Activity     Alcohol use: Yes     Comment: occ     Drug use: Yes     Types: Marijuana     Comment: prescription      Sexual activity: Not Currently   Other Topics Concern     Not on file   Social History Narrative     Not on file     Social Determinants of Health     Financial Resource Strain:      Difficulty of Paying Living Expenses:    Food Insecurity:      Worried About Running Out of Food in the Last Year:      Ran Out of Food in the Last Year:    Transportation Needs:      Lack of Transportation (Medical):      Lack of Transportation (Non-Medical):    Physical Activity:      Days of Exercise per Week:      Minutes of Exercise per Session:    Stress:      Feeling of Stress :    Social Connections:      Frequency of Communication with Friends and Family:      Frequency of Social Gatherings with Friends and Family:      Attends Jainism Services:      Active Member of Clubs or Organizations:      Attends Club or Organization Meetings:      Marital Status:    Intimate Partner Violence:      Fear of Current or Ex-Partner:      Emotionally Abused:      Physically Abused:      Sexually Abused:        Family History - History reviewed. No pertinent family history.    Review of Systems - As per HPI and PMHx, otherwise review of system review of the head and neck negative. Otherwise 10+ review of system is negative    Physical Exam  BP (!) 158/98 (BP Location: Right arm, Patient Position: Sitting, Cuff Size: Adult Regular)   Temp 98.1  F (36.7  C) (Temporal)   Ht 1.778 m (5' 10\")   Wt 80.4 kg (177 lb 4 oz)   BMI 25.43 kg/m    BMI: Body mass index is 25.43 kg/m .    General - The patient is well nourished and well developed, and appears to have good nutritional status.  Alert and oriented " to person and place, answers questions and cooperates with examination appropriately.    SKIN - No suspicious lesions or rashes.  Respiration - No respiratory distress.  Head and Face - Normocephalic and atraumatic, with no gross asymmetry noted of the contour of the facial features.  The facial nerve is intact, with strong symmetric movements.    Voice and Breathing - The patient was breathing comfortably without the use of accessory muscles. The patients voice was clear and strong, and had appropriate pitch and quality.    Ears - Bilateral pinna and EACs with normal appearing overlying skin. Tympanic membrane intact with good mobility on pneumatic otoscopy bilaterally. Bony landmarks of the ossicular chain are normal. The tympanic membranes are normal in appearance. No retraction, perforation, or masses.  No fluid or purulence was seen in the external canal or the middle ear.   Severe tenderness on palpation around the tragus of the temporomandibular space on the left side.  Eyes - Extraocular movements intact.  Sclera were not icteric or injected, conjunctiva were pink and moist.    Mouth - Examination of the oral cavity showed pink, healthy oral mucosa. No lesions or ulcerations noted.  The tongue was mobile and midline, and the dentition were in good condition.      Throat - The walls of the oropharynx were smooth, pink, moist, symmetric, and had no lesions or ulcerations.  The tonsillar pillars and soft palate were symmetric. Tonsils are symmetric. The uvula was midline on elevation.    Neck - Normal midline excursion of the laryngotracheal complex during swallowing.  Full range of motion on passive movement.  Palpation of the occipital, submental, submandibular, internal jugular chain, and supraclavicular nodes did not demonstrate any abnormal lymph nodes or masses.  The carotid pulse was palpable bilaterally.  Palpation of the thyroid was soft and smooth, with no nodules or goiter appreciated.  The trachea  was mobile and midline.    Nose - External contour is symmetric, no gross deflection or scars.  Nasal mucosa is pink and moist with no abnormal mucus.  The septum was midline and non-obstructive, turbinates of normal size and position.  No polyps, masses, or purulence noted on examination.    Neuro - Nonfocal neuro exam is normal, CN 2 through 12 intact, normal gait and muscle tone.      Performed in clinic today:   audiogram performed today showed overall normal results.  Normal type A tympanograms noted bilaterally.  Normal pure-tone thresholds bilaterally.  Word recognition score 96% on the right and 100% on the left.      A/P - Reji Choi is a 41 year old male Patient presents with:  RECHECK    Patient with a severe musculoskeletal/temporomandibular issues bilaterally and on the left side.  Considering he did not do well with the Medrol Dosepak and in an effort to avoid use of opiates I will start him on ketorolac for 6 days.  If he has any GI irritation he will contact us right away.  In the meantime also referred the patient to the [place] [facility] for further evaluation and management of TMJ related otolgia.     Derrick Valle MD    Apparently patient tried to ketorolac begin severe headache.  He called his back desperate due to his pain.  Checking to patient's past history it appears that he did have a controlled substance agreement in 2018 which has  couple years ago.  It has not been renewed.  At this point therefore after discussing this with the patient over the phone I have sent a prescription for Norco 5 mg every 6-8 hours.  In total prescription with 10 pills were sent without any refills.  If patient continues to experience pain he will have to pursue this in the emergency department.  Otherwise I have stressed to the patient that he must be seen at the [place] [facility] to address this pain situation.

## 2021-08-30 ENCOUNTER — TELEPHONE (OUTPATIENT)
Dept: OTOLARYNGOLOGY | Facility: CLINIC | Age: 41
End: 2021-08-30

## 2021-08-30 ENCOUNTER — OFFICE VISIT (OUTPATIENT)
Dept: AUDIOLOGY | Facility: CLINIC | Age: 41
End: 2021-08-30
Payer: COMMERCIAL

## 2021-08-30 ENCOUNTER — OFFICE VISIT (OUTPATIENT)
Dept: OTOLARYNGOLOGY | Facility: CLINIC | Age: 41
End: 2021-08-30
Payer: COMMERCIAL

## 2021-08-30 VITALS
WEIGHT: 177.25 LBS | TEMPERATURE: 98.1 F | DIASTOLIC BLOOD PRESSURE: 98 MMHG | HEIGHT: 70 IN | SYSTOLIC BLOOD PRESSURE: 158 MMHG | BODY MASS INDEX: 25.38 KG/M2

## 2021-08-30 DIAGNOSIS — M26.609 TMJ (TEMPOROMANDIBULAR JOINT SYNDROME): ICD-10-CM

## 2021-08-30 DIAGNOSIS — H92.02 OTALGIA, LEFT: Primary | ICD-10-CM

## 2021-08-30 DIAGNOSIS — H92.03 OTALGIA OF BOTH EARS: Primary | ICD-10-CM

## 2021-08-30 PROCEDURE — 99207 PR NO CHARGE LOS: CPT | Performed by: AUDIOLOGIST

## 2021-08-30 PROCEDURE — 92557 COMPREHENSIVE HEARING TEST: CPT | Performed by: AUDIOLOGIST

## 2021-08-30 PROCEDURE — 92550 TYMPANOMETRY & REFLEX THRESH: CPT | Performed by: AUDIOLOGIST

## 2021-08-30 PROCEDURE — 99213 OFFICE O/P EST LOW 20 MIN: CPT | Performed by: OTOLARYNGOLOGY

## 2021-08-30 RX ORDER — KETOROLAC TROMETHAMINE 10 MG/1
10 TABLET, FILM COATED ORAL EVERY 6 HOURS PRN
Qty: 20 TABLET | Refills: 0 | Status: SHIPPED | OUTPATIENT
Start: 2021-08-30 | End: 2021-11-02

## 2021-08-30 RX ORDER — HYDROCODONE BITARTRATE AND ACETAMINOPHEN 5; 325 MG/1; MG/1
1 TABLET ORAL EVERY 6 HOURS PRN
Qty: 10 TABLET | Refills: 0 | Status: SHIPPED | OUTPATIENT
Start: 2021-08-30 | End: 2021-09-02

## 2021-08-30 ASSESSMENT — PAIN SCALES - GENERAL: PAINLEVEL: MODERATE PAIN (4)

## 2021-08-30 ASSESSMENT — MIFFLIN-ST. JEOR: SCORE: 1705.25

## 2021-08-30 NOTE — PROGRESS NOTES
AUDIOLOGY REPORT     SUMMARY: Audiology visit completed. See audiogram for results.     RECOMMENDATIONS: Follow-up with ENT    Margo Zambrano Licensed Audiologist #[id number]

## 2021-08-30 NOTE — LETTER
8/30/2021         RE: Reji Choi  [address] [address] [address] [id number]        Dear Colleague,    Thank you for referring your patient, Reji Choi, to the [facility]. Please see a copy of my visit note below.    History of Present Illness - Reji Choi is a 41 year old male presenting in clinic today for a recheck on Patient presents with:  RECHECK    Patient presents in follow-up after his initial presentation with severe right-sided otalgia that was felt to be musculoskeletal/TMJ in origin.  Medrol Dosepak that he tried apparently given the low-grade fever did not help and he thinks a lot of ibuprofen use local heat in the right ear is better.  Now he started having normal on the left the same severe pain sharp pain going towards the angle of the jaw and the neck.  Patient never had a chance to see orthodontist anybody about his occlusion causing and contributing to the issue.    He denies any discharge he denies any changes in hearing.    BP Readings from Last 1 Encounters:   08/30/21 (!) 158/98       BP noted to be elevated today in office.  Patient to follow up with Primary Care provider regarding elevated blood pressure.    Reji IS NOT a smoker/uses chewing tobacco.    Past Medical History - History reviewed. No pertinent past medical history.    Current Medications -   Current Outpatient Medications:      colestipol (COLESTID) 1 g tablet, TAKE 2 TABLETS BY MOUTH TWICE A DAY, Disp: , Rfl:      desonide (DESOWEN) 0.05 % external cream, , Disp: , Rfl:      escitalopram (LEXAPRO) 20 MG tablet, Take 20 mg by mouth, Disp: , Rfl:      losartan (COZAAR) 100 MG tablet, Take 100 mg by mouth, Disp: , Rfl:      MELATONIN TR PO, , Disp: , Rfl:      METOPROLOL TARTRATE PO, Take 200 mg by mouth , Disp: , Rfl:      omeprazole (PRILOSEC) 20 MG DR capsule, , Disp: , Rfl:     Allergies -   Allergies   Allergen Reactions     Clindamycin      Pravastatin Other (See Comments)      Rosuvastatin Muscle Pain (Myalgia)     Simvastatin Muscle Pain (Myalgia)       Social History -   Social History     Socioeconomic History     Marital status:      Spouse name: Not on file     Number of children: Not on file     Years of education: Not on file     Highest education level: Not on file   Occupational History     Not on file   Tobacco Use     Smoking status: Former Smoker     Smokeless tobacco: Never Used     Tobacco comment: eight months ago   Substance and Sexual Activity     Alcohol use: Yes     Comment: occ     Drug use: Yes     Types: Marijuana     Comment: prescription      Sexual activity: Not Currently   Other Topics Concern     Not on file   Social History Narrative     Not on file     Social Determinants of Health     Financial Resource Strain:      Difficulty of Paying Living Expenses:    Food Insecurity:      Worried About Running Out of Food in the Last Year:      Ran Out of Food in the Last Year:    Transportation Needs:      Lack of Transportation (Medical):      Lack of Transportation (Non-Medical):    Physical Activity:      Days of Exercise per Week:      Minutes of Exercise per Session:    Stress:      Feeling of Stress :    Social Connections:      Frequency of Communication with Friends and Family:      Frequency of Social Gatherings with Friends and Family:      Attends Scientologist Services:      Active Member of Clubs or Organizations:      Attends Club or Organization Meetings:      Marital Status:    Intimate Partner Violence:      Fear of Current or Ex-Partner:      Emotionally Abused:      Physically Abused:      Sexually Abused:        Family History - History reviewed. No pertinent family history.    Review of Systems - As per HPI and PMHx, otherwise review of system review of the head and neck negative. Otherwise 10+ review of system is negative    Physical Exam  BP (!) 158/98 (BP Location: Right arm, Patient Position: Sitting, Cuff Size: Adult Regular)   Temp 98.1  " F (36.7  C) (Temporal)   Ht 1.778 m (5' 10\")   Wt 80.4 kg (177 lb 4 oz)   BMI 25.43 kg/m    BMI: Body mass index is 25.43 kg/m .    General - The patient is well nourished and well developed, and appears to have good nutritional status.  Alert and oriented to person and place, answers questions and cooperates with examination appropriately.    SKIN - No suspicious lesions or rashes.  Respiration - No respiratory distress.  Head and Face - Normocephalic and atraumatic, with no gross asymmetry noted of the contour of the facial features.  The facial nerve is intact, with strong symmetric movements.    Voice and Breathing - The patient was breathing comfortably without the use of accessory muscles. The patients voice was clear and strong, and had appropriate pitch and quality.    Ears - Bilateral pinna and EACs with normal appearing overlying skin. Tympanic membrane intact with good mobility on pneumatic otoscopy bilaterally. Bony landmarks of the ossicular chain are normal. The tympanic membranes are normal in appearance. No retraction, perforation, or masses.  No fluid or purulence was seen in the external canal or the middle ear.   Severe tenderness on palpation around the tragus of the temporomandibular space on the left side.  Eyes - Extraocular movements intact.  Sclera were not icteric or injected, conjunctiva were pink and moist.    Mouth - Examination of the oral cavity showed pink, healthy oral mucosa. No lesions or ulcerations noted.  The tongue was mobile and midline, and the dentition were in good condition.      Throat - The walls of the oropharynx were smooth, pink, moist, symmetric, and had no lesions or ulcerations.  The tonsillar pillars and soft palate were symmetric. Tonsils are symmetric. The uvula was midline on elevation.    Neck - Normal midline excursion of the laryngotracheal complex during swallowing.  Full range of motion on passive movement.  Palpation of the occipital, submental, " submandibular, internal jugular chain, and supraclavicular nodes did not demonstrate any abnormal lymph nodes or masses.  The carotid pulse was palpable bilaterally.  Palpation of the thyroid was soft and smooth, with no nodules or goiter appreciated.  The trachea was mobile and midline.    Nose - External contour is symmetric, no gross deflection or scars.  Nasal mucosa is pink and moist with no abnormal mucus.  The septum was midline and non-obstructive, turbinates of normal size and position.  No polyps, masses, or purulence noted on examination.    Neuro - Nonfocal neuro exam is normal, CN 2 through 12 intact, normal gait and muscle tone.      Performed in clinic today:  No procedures preformed in clinic today      A/P - Reji Choi is a 41 year old male Patient presents with:  RECHECK    Patient with a severe musculoskeletal/temporomandibular issues bilaterally and on the left side.  Considering he did not do well with the Medrol Dosepak and in an effort to avoid use of opiates I will start him on ketorolac for 6 days.  If he has any GI irritation he will contact us right away.  In the meantime also referred the patient to the [place] [facility] for further evaluation and management of TMJ related otolgia.     Derrick Valle MD            Again, thank you for allowing me to participate in the care of your patient.        Sincerely,        Derrick Valle MD, MD

## 2021-08-30 NOTE — TELEPHONE ENCOUNTER
Reason for Call:  Other call back    Detailed comments: Stating he was seen today and since taking the medication his symptoms are worse.  He only wants to speak with Dr. Valle and wanted him to be aware he is upset.  Please call asap    Phone Number Patient can be reached at: Cell number on file:    Telephone Information:   Mobile [phone]   Mobile        Best Time: any    Can we leave a detailed message on this number? YES    Call taken on 8/30/2021 at 12:31 PM by Mary Garcia

## 2021-11-02 ENCOUNTER — APPOINTMENT (OUTPATIENT)
Dept: CT IMAGING | Facility: CLINIC | Age: 41
End: 2021-11-02
Attending: NURSE PRACTITIONER
Payer: COMMERCIAL

## 2021-11-02 ENCOUNTER — HOSPITAL ENCOUNTER (EMERGENCY)
Facility: CLINIC | Age: 41
Discharge: HOME | End: 2021-11-02
Attending: NURSE PRACTITIONER | Admitting: NURSE PRACTITIONER
Payer: COMMERCIAL

## 2021-11-02 VITALS
RESPIRATION RATE: 18 BRPM | DIASTOLIC BLOOD PRESSURE: 97 MMHG | SYSTOLIC BLOOD PRESSURE: 152 MMHG | OXYGEN SATURATION: 97 % | BODY MASS INDEX: 24.74 KG/M2 | WEIGHT: 172.4 LBS | HEART RATE: 71 BPM | TEMPERATURE: 98.4 F

## 2021-11-02 DIAGNOSIS — K57.92 ACUTE DIVERTICULITIS: ICD-10-CM

## 2021-11-02 LAB
ALBUMIN SERPL-MCNC: 4.3 G/DL (ref 3.4–5)
ALBUMIN UR-MCNC: NEGATIVE MG/DL
ALP SERPL-CCNC: 64 U/L (ref 40–150)
ALT SERPL W P-5'-P-CCNC: 27 U/L (ref 0–70)
ANION GAP SERPL CALCULATED.3IONS-SCNC: 5 MMOL/L (ref 3–14)
APPEARANCE UR: CLEAR
AST SERPL W P-5'-P-CCNC: 13 U/L (ref 0–45)
BASOPHILS # BLD AUTO: 0 10E3/UL (ref 0–0.2)
BASOPHILS NFR BLD AUTO: 0 %
BILIRUB SERPL-MCNC: 0.9 MG/DL (ref 0.2–1.3)
BILIRUB UR QL STRIP: NEGATIVE
BUN SERPL-MCNC: 13 MG/DL (ref 7–30)
CALCIUM SERPL-MCNC: 9.2 MG/DL (ref 8.5–10.1)
CHLORIDE BLD-SCNC: 103 MMOL/L (ref 94–109)
CO2 SERPL-SCNC: 25 MMOL/L (ref 20–32)
COLOR UR AUTO: YELLOW
CREAT SERPL-MCNC: 0.63 MG/DL (ref 0.66–1.25)
EOSINOPHIL # BLD AUTO: 0.2 10E3/UL (ref 0–0.7)
EOSINOPHIL NFR BLD AUTO: 1 %
ERYTHROCYTE [DISTWIDTH] IN BLOOD BY AUTOMATED COUNT: 12.4 % (ref 10–15)
GFR SERPL CREATININE-BSD FRML MDRD: >90 ML/MIN/1.73M2
GLUCOSE BLD-MCNC: 107 MG/DL (ref 70–99)
GLUCOSE UR STRIP-MCNC: NEGATIVE MG/DL
HCT VFR BLD AUTO: 40.6 % (ref 40–53)
HGB BLD-MCNC: 14.1 G/DL (ref 13.3–17.7)
HGB UR QL STRIP: ABNORMAL
HOLD SPECIMEN: NORMAL
IMM GRANULOCYTES # BLD: 0 10E3/UL
IMM GRANULOCYTES NFR BLD: 0 %
KETONES UR STRIP-MCNC: NEGATIVE MG/DL
LEUKOCYTE ESTERASE UR QL STRIP: NEGATIVE
LYMPHOCYTES # BLD AUTO: 2.2 10E3/UL (ref 0.8–5.3)
LYMPHOCYTES NFR BLD AUTO: 18 %
MCH RBC QN AUTO: 30.9 PG (ref 26.5–33)
MCHC RBC AUTO-ENTMCNC: 34.7 G/DL (ref 31.5–36.5)
MCV RBC AUTO: 89 FL (ref 78–100)
MONOCYTES # BLD AUTO: 1 10E3/UL (ref 0–1.3)
MONOCYTES NFR BLD AUTO: 9 %
MUCOUS THREADS #/AREA URNS LPF: PRESENT /LPF
NEUTROPHILS # BLD AUTO: 8.6 10E3/UL (ref 1.6–8.3)
NEUTROPHILS NFR BLD AUTO: 72 %
NITRATE UR QL: NEGATIVE
NRBC # BLD AUTO: 0 10E3/UL
NRBC BLD AUTO-RTO: 0 /100
PH UR STRIP: 6 [PH] (ref 5–7)
PLATELET # BLD AUTO: 317 10E3/UL (ref 150–450)
POTASSIUM BLD-SCNC: 3.9 MMOL/L (ref 3.4–5.3)
PROT SERPL-MCNC: 8.3 G/DL (ref 6.8–8.8)
RBC # BLD AUTO: 4.56 10E6/UL (ref 4.4–5.9)
RBC URINE: 3 /HPF
SODIUM SERPL-SCNC: 133 MMOL/L (ref 133–144)
SP GR UR STRIP: 1.02 (ref 1–1.03)
SPERM #/AREA URNS HPF: PRESENT /HPF
UROBILINOGEN UR STRIP-MCNC: NORMAL MG/DL
WBC # BLD AUTO: 12 10E3/UL (ref 4–11)
WBC URINE: 0 /HPF

## 2021-11-02 PROCEDURE — 99285 EMERGENCY DEPT VISIT HI MDM: CPT | Performed by: NURSE PRACTITIONER

## 2021-11-02 PROCEDURE — 81001 URINALYSIS AUTO W/SCOPE: CPT | Performed by: NURSE PRACTITIONER

## 2021-11-02 PROCEDURE — 74177 CT ABD & PELVIS W/CONTRAST: CPT

## 2021-11-02 PROCEDURE — 99285 EMERGENCY DEPT VISIT HI MDM: CPT | Mod: 25 | Performed by: NURSE PRACTITIONER

## 2021-11-02 PROCEDURE — 85025 COMPLETE CBC W/AUTO DIFF WBC: CPT | Performed by: NURSE PRACTITIONER

## 2021-11-02 PROCEDURE — 250N000011 HC RX IP 250 OP 636: Performed by: NURSE PRACTITIONER

## 2021-11-02 PROCEDURE — 258N000003 HC RX IP 258 OP 636: Performed by: NURSE PRACTITIONER

## 2021-11-02 PROCEDURE — 250N000011 HC RX IP 250 OP 636: Performed by: RADIOLOGY

## 2021-11-02 PROCEDURE — 96361 HYDRATE IV INFUSION ADD-ON: CPT | Performed by: NURSE PRACTITIONER

## 2021-11-02 PROCEDURE — 36415 COLL VENOUS BLD VENIPUNCTURE: CPT | Performed by: NURSE PRACTITIONER

## 2021-11-02 PROCEDURE — 96374 THER/PROPH/DIAG INJ IV PUSH: CPT | Mod: 59 | Performed by: NURSE PRACTITIONER

## 2021-11-02 PROCEDURE — 250N000009 HC RX 250: Performed by: RADIOLOGY

## 2021-11-02 PROCEDURE — 82040 ASSAY OF SERUM ALBUMIN: CPT | Performed by: NURSE PRACTITIONER

## 2021-11-02 RX ORDER — SODIUM CHLORIDE 9 MG/ML
INJECTION, SOLUTION INTRAVENOUS CONTINUOUS
Status: DISCONTINUED | OUTPATIENT
Start: 2021-11-02 | End: 2021-11-02 | Stop reason: HOSPADM

## 2021-11-02 RX ORDER — PHENOL 1.4 %
30-50 AEROSOL, SPRAY (ML) MUCOUS MEMBRANE
COMMUNITY
End: 2023-05-08

## 2021-11-02 RX ORDER — METOPROLOL SUCCINATE 200 MG/1
200 TABLET, EXTENDED RELEASE ORAL DAILY
COMMUNITY
Start: 2021-08-24

## 2021-11-02 RX ORDER — IOPAMIDOL 755 MG/ML
500 INJECTION, SOLUTION INTRAVASCULAR ONCE
Status: COMPLETED | OUTPATIENT
Start: 2021-11-02 | End: 2021-11-02

## 2021-11-02 RX ORDER — HYDROMORPHONE HYDROCHLORIDE 1 MG/ML
0.5 INJECTION, SOLUTION INTRAMUSCULAR; INTRAVENOUS; SUBCUTANEOUS ONCE
Status: COMPLETED | OUTPATIENT
Start: 2021-11-02 | End: 2021-11-02

## 2021-11-02 RX ORDER — OXYCODONE HYDROCHLORIDE 5 MG/1
5 TABLET ORAL EVERY 6 HOURS PRN
Qty: 12 TABLET | Refills: 0 | Status: SHIPPED | OUTPATIENT
Start: 2021-11-02 | End: 2023-05-08

## 2021-11-02 RX ORDER — IOPAMIDOL 755 MG/ML
100 INJECTION, SOLUTION INTRAVASCULAR ONCE
Status: DISCONTINUED | OUTPATIENT
Start: 2021-11-02 | End: 2021-11-02 | Stop reason: CLARIF

## 2021-11-02 RX ADMIN — SODIUM CHLORIDE 60 ML: 9 INJECTION, SOLUTION INTRAVENOUS at 16:08

## 2021-11-02 RX ADMIN — HYDROMORPHONE HYDROCHLORIDE 0.5 MG: 1 INJECTION, SOLUTION INTRAMUSCULAR; INTRAVENOUS; SUBCUTANEOUS at 16:53

## 2021-11-02 RX ADMIN — IOPAMIDOL 84 ML: 755 INJECTION, SOLUTION INTRAVENOUS at 16:08

## 2021-11-02 RX ADMIN — SODIUM CHLORIDE 1000 ML: 9 INJECTION, SOLUTION INTRAVENOUS at 16:25

## 2021-11-02 NOTE — ED PROVIDER NOTES
History     Chief Complaint   Patient presents with     Abdominal Pain     HPI  Reji Choi is a 41 year old male who presents for evaluation of abdominal pain.  Symptoms started yesterday morning.  Pain has been persistent and focal to his left lower quadrant.  Denies fever or chills.  Denies nausea or vomiting.  Decreased appetite today.  No significant change in his bowel movements, but states ever since he had his gallbladder removed he has chronic loose stools.  Denies dysuria or hematuria.  Reports colonoscopy done when he was in his 20s, had polyps removed and they were benign.     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 No pertinent past medical history.    Past Surgical History:    Past Surgical History:   Procedure Laterality Date     CHOLECYSTECTOMY         Family History:    History reviewed. No pertinent family history.    Social History:  Marital Status:   [2]  Social History     Tobacco Use     Smoking status: Former Smoker     Smokeless  tobacco: Never Used     Tobacco comment: eight months ago   Substance Use Topics     Alcohol use: Yes     Comment: occ     Drug use: Yes     Types: Marijuana     Comment: prescription         Medications:    amoxicillin-clavulanate (AUGMENTIN) 875-125 MG tablet  colestipol (COLESTID) 1 g tablet  COMPOUNDED NON-CONTROLLED SUBSTANCE (CMPD RX) - PHARMACY TO MIX COMPOUNDED MEDICATION  escitalopram (LEXAPRO) 20 MG tablet  losartan (COZAAR) 100 MG tablet  Melatonin 10 MG TABS tablet  metoprolol succinate ER (TOPROL-XL) 200 MG 24 hr tablet  omeprazole (PRILOSEC) 20 MG DR capsule  oxyCODONE (ROXICODONE) 5 MG tablet  desonide (DESOWEN) 0.05 % external cream          Review of Systems  As mentioned above in the history present illness. All other systems were reviewed and are negative.    Physical Exam   BP: (!) 151/100  Pulse: 71  Temp: 98.4  F (36.9  C)  Resp: 18  Weight: 78.2 kg (172 lb 6.4 oz)  SpO2: 97 %      Physical Exam  Constitutional:       General: He is not in acute distress.     Appearance: Normal appearance. He is well-developed. He is not ill-appearing.   HENT:      Head: Normocephalic and atraumatic.      Right Ear: External ear normal.      Left Ear: External ear normal.      Nose: Nose normal.      Mouth/Throat:      Mouth: Mucous membranes are moist.   Eyes:      Conjunctiva/sclera: Conjunctivae normal.   Cardiovascular:      Rate and Rhythm: Normal rate and regular rhythm.      Heart sounds: Normal heart sounds. No murmur heard.     Pulmonary:      Effort: Pulmonary effort is normal. No respiratory distress.      Breath sounds: Normal breath sounds.   Abdominal:      General: Bowel sounds are normal. There is no distension.      Palpations: Abdomen is soft. There is no hepatomegaly or splenomegaly.      Tenderness: There is abdominal tenderness in the left lower quadrant.   Musculoskeletal:         General: Normal range of motion.   Skin:     General: Skin is warm and dry.      Findings: No rash.    Neurological:      General: No focal deficit present.      Mental Status: He is alert and oriented to person, place, and time.         ED Course        Procedures              Results for orders placed or performed during the hospital encounter of 11/02/21 (from the past 24 hour(s))   CBC with platelets differential    Narrative    The following orders were created for panel order CBC with platelets differential.  Procedure                               Abnormality         Status                     ---------                               -----------         ------                     CBC with platelets and d...[[id number]]  Abnormal            Final result                 Please view results for these tests on the individual orders.   Comprehensive metabolic panel   Result Value Ref Range    Sodium 133 133 - 144 mmol/L    Potassium 3.9 3.4 - 5.3 mmol/L    Chloride 103 94 - 109 mmol/L    Carbon Dioxide (CO2) 25 20 - 32 mmol/L    Anion Gap 5 3 - 14 mmol/L    Urea Nitrogen 13 7 - 30 mg/dL    Creatinine 0.63 (L) 0.66 - 1.25 mg/dL    Calcium 9.2 8.5 - 10.1 mg/dL    Glucose 107 (H) 70 - 99 mg/dL    Alkaline Phosphatase 64 40 - 150 U/L    AST 13 0 - 45 U/L    ALT 27 0 - 70 U/L    Protein Total 8.3 6.8 - 8.8 g/dL    Albumin 4.3 3.4 - 5.0 g/dL    Bilirubin Total 0.9 0.2 - 1.3 mg/dL    GFR Estimate >90 >60 mL/min/1.73m2   UA with Microscopic reflex to Culture    Specimen: Urine, Midstream   Result Value Ref Range    Color Urine Yellow Colorless, Straw, Light Yellow, Yellow    Appearance Urine Clear Clear    Glucose Urine Negative Negative mg/dL    Bilirubin Urine Negative Negative    Ketones Urine Negative Negative mg/dL    Specific Gravity Urine 1.019 1.003 - 1.035    Blood Urine Moderate (A) Negative    pH Urine 6.0 5.0 - 7.0    Protein Albumin Urine Negative Negative mg/dL    Urobilinogen Urine Normal Normal, 2.0 mg/dL    Nitrite Urine Negative Negative    Leukocyte Esterase Urine Negative Negative    Mucus Urine Present  (A) None Seen /LPF    Sperm Urine Present (A) None Seen /HPF    RBC Urine 3 (H) <=2 /HPF    WBC Urine 0 <=5 /HPF    Narrative    Urine Culture not indicated   CBC with platelets and differential   Result Value Ref Range    WBC Count 12.0 (H) 4.0 - 11.0 10e3/uL    RBC Count 4.56 4.40 - 5.90 10e6/uL    Hemoglobin 14.1 13.3 - 17.7 g/dL    Hematocrit 40.6 40.0 - 53.0 %    MCV 89 78 - 100 fL    MCH 30.9 26.5 - 33.0 pg    MCHC 34.7 31.5 - 36.5 g/dL    RDW 12.4 10.0 - 15.0 %    Platelet Count 317 150 - 450 10e3/uL    % Neutrophils 72 %    % Lymphocytes 18 %    % Monocytes 9 %    % Eosinophils 1 %    % Basophils 0 %    % Immature Granulocytes 0 %    NRBCs per 100 WBC 0 <1 /100    Absolute Neutrophils 8.6 (H) 1.6 - 8.3 10e3/uL    Absolute Lymphocytes 2.2 0.8 - 5.3 10e3/uL    Absolute Monocytes 1.0 0.0 - 1.3 10e3/uL    Absolute Eosinophils 0.2 0.0 - 0.7 10e3/uL    Absolute Basophils 0.0 0.0 - 0.2 10e3/uL    Absolute Immature Granulocytes 0.0 <=0.0 10e3/uL    Absolute NRBCs 0.0 10e3/uL   [facility] Draw    Narrative    The following orders were created for panel order [facility] Draw.  Procedure                               Abnormality         Status                     ---------                               -----------         ------                     Extra Green Top (Lithium...[[id number]]                      Final result                 Please view results for these tests on the individual orders.   Extra Green Top (Lithium Heparin) ON ICE   Result Value Ref Range    Hold Specimen     CT Abdomen Pelvis w Contrast    Narrative    CT ABDOMEN PELVIS W CONTRAST 11/2/2021 4:15 PM    CLINICAL HISTORY: Diverticulitis suspected; left lower quadrant  abdominal pain.    TECHNIQUE: CT scan of the abdomen and pelvis was performed following  injection of IV contrast. Multiplanar reformats were obtained. Dose  reduction techniques were used.  CONTRAST: Isovue 370, 84mL    COMPARISON: None.    FINDINGS:   LOWER CHEST:  Normal.    HEPATOBILIARY: Cholecystectomy.    PANCREAS: Normal.    SPLEEN: Normal.    ADRENAL GLANDS: Normal.    KIDNEYS/BLADDER: Normal.    BOWEL: Focal inflammation of the proximal sigmoid colon centered on a  diverticulum. No abscess or obstruction.    PELVIC ORGANS: Normal.    ADDITIONAL FINDINGS: None.    MUSCULOSKELETAL: Normal.      Impression    IMPRESSION:   1.  Acute uncomplicated sigmoid colonic diverticulitis.    WANDA DINH MD         SYSTEM ID:  [id number]       Medications   0.9% sodium chloride BOLUS (0 mLs Intravenous Stopped 11/2/21 1656)   iopamidol (ISOVUE-370) solution 500 mL (84 mLs Intravenous Given 11/2/21 1608)   sodium chloride 0.9 % bag 100mL for CT scan flush use (60 mLs Intravenous Given 11/2/21 1608)   HYDROmorphone (PF) (DILAUDID) injection 0.5 mg (0.5 mg Intravenous Given 11/2/21 1653)       Assessments & Plan (with Medical Decision Making)   41-year-old male with left lower quadrant abdominal pain that started yesterday morning.  No fevers.  No nausea or vomiting.  Exam he is afebrile.  BP is mildly elevated.  No tachycardia.  Tenderness focal to the left lower quadrant, otherwise abdomen is soft and nondistended.  WBC 12.0.  Electrolytes are normal.  Kidney function is normal.  LFTs are normal.  No evidence of UTI.  Abdominal/pelvis CT reveals uncomplicated acute diverticulitis of the sigmoid colon.  (See full radiologist report as noted above).  There is no evidence of abscess or perforation.  No evidence obstruction.  I discussed the lab and imaging findings with patient.  He will be started on a course of antibiotics.  Patient was given IV normal saline 1 L bolus and IV Dilaudid for pain.  Plan as follows:  See handout.  Augmentin 875 mg twice a day for 10 days.  Tylenol 650 mg every 4-6 hours as needed for pain.  Ibuprofen 400-600 mg every 6-8 hours as needed for pain  (take with food, stop if causing stomach pains.)  Oxycodone 5 mg every 6 hours as needed for moderate/severe  pain. Do not drive or drink alcohol on this medication.  Return to the emergency department for fevers, vomiting, increased pain, black or bloody stools, or worse in any way.    I have reviewed the nursing notes.    I have reviewed the findings, diagnosis, plan and need for follow up with the patient.      Discharge Medication List as of 11/2/2021  5:05 PM      START taking these medications    Details   amoxicillin-clavulanate (AUGMENTIN) 875-125 MG tablet Take 1 tablet by mouth 2 times daily for 10 days, Disp-20 tablet, R-0, E-Prescribe      oxyCODONE (ROXICODONE) 5 MG tablet Take 1 tablet (5 mg) by mouth every 6 hours as needed for pain, Disp-12 tablet, R-0, E-Prescribe             Final diagnoses:   Acute diverticulitis       11/2/2021   [facility] EMERGENCY DEPT     Lilly, LEIDY Guy CNP  11/03/21 0039

## 2021-11-02 NOTE — DISCHARGE INSTRUCTIONS
See handout.  Augmentin 875 mg twice a day for 10 days.  Tylenol 650 mg every 4-6 hours as needed for pain.  Ibuprofen 400-600 mg every 6-8 hours as needed for pain  (take with food, stop if causing stomach pains.)  Oxycodone 5 mg every 6 hours as needed for moderate/severe pain. Do not drive or drink alcohol on this medication.  Return to the emergency department for fevers, vomiting, increased pain, black or bloody stools, or worse in any way.

## 2022-12-24 ENCOUNTER — HOSPITAL ENCOUNTER (EMERGENCY)
Facility: CLINIC | Age: 42
Discharge: HOME | End: 2022-12-25
Attending: PHYSICIAN ASSISTANT | Admitting: PHYSICIAN ASSISTANT
Payer: COMMERCIAL

## 2022-12-24 DIAGNOSIS — T78.2XXA ANAPHYLAXIS, INITIAL ENCOUNTER: ICD-10-CM

## 2022-12-24 DIAGNOSIS — L08.9 INFECTED CAT BITE OF FINGER, INITIAL ENCOUNTER: ICD-10-CM
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
## 2022-12-24 DIAGNOSIS — S61.259A INFECTED CAT BITE OF FINGER, INITIAL ENCOUNTER: ICD-10-CM

## 2022-12-24 PROCEDURE — 99284 EMERGENCY DEPT VISIT MOD MDM: CPT | Performed by: PHYSICIAN ASSISTANT

## 2022-12-24 PROCEDURE — 96365 THER/PROPH/DIAG IV INF INIT: CPT | Performed by: PHYSICIAN ASSISTANT

## 2022-12-24 PROCEDURE — 250N000013 HC RX MED GY IP 250 OP 250 PS 637: Performed by: PHYSICIAN ASSISTANT

## 2022-12-24 PROCEDURE — 250N000009 HC RX 250

## 2022-12-24 PROCEDURE — 99284 EMERGENCY DEPT VISIT MOD MDM: CPT | Mod: 25 | Performed by: PHYSICIAN ASSISTANT

## 2022-12-24 PROCEDURE — 250N000011 HC RX IP 250 OP 636: Performed by: PHYSICIAN ASSISTANT

## 2022-12-24 PROCEDURE — 96375 TX/PRO/DX INJ NEW DRUG ADDON: CPT | Performed by: PHYSICIAN ASSISTANT

## 2022-12-24 PROCEDURE — 94640 AIRWAY INHALATION TREATMENT: CPT | Performed by: PHYSICIAN ASSISTANT

## 2022-12-24 RX ORDER — OXYCODONE HYDROCHLORIDE 5 MG/1
5 TABLET ORAL ONCE
Status: COMPLETED | OUTPATIENT
Start: 2022-12-24 | End: 2022-12-24

## 2022-12-24 RX ORDER — DIPHENHYDRAMINE HYDROCHLORIDE 50 MG/ML
INJECTION INTRAMUSCULAR; INTRAVENOUS
Status: DISCONTINUED
Start: 2022-12-24 | End: 2022-12-25 | Stop reason: HOSPADM

## 2022-12-24 RX ORDER — IPRATROPIUM BROMIDE AND ALBUTEROL SULFATE 2.5; .5 MG/3ML; MG/3ML
SOLUTION RESPIRATORY (INHALATION)
Status: COMPLETED
Start: 2022-12-24 | End: 2022-12-24

## 2022-12-24 RX ORDER — AMPICILLIN AND SULBACTAM 2; 1 G/1; G/1
3 INJECTION, POWDER, FOR SOLUTION INTRAMUSCULAR; INTRAVENOUS ONCE
Status: COMPLETED | OUTPATIENT
Start: 2022-12-24 | End: 2022-12-24

## 2022-12-24 RX ORDER — METRONIDAZOLE 500 MG/1
500 TABLET ORAL 3 TIMES DAILY
Qty: 30 TABLET | Refills: 0 | Status: SHIPPED | OUTPATIENT
Start: 2022-12-24 | End: 2023-05-08

## 2022-12-24 RX ORDER — FAMOTIDINE 20 MG/1
40 TABLET, FILM COATED ORAL ONCE
Status: COMPLETED | OUTPATIENT
Start: 2022-12-24 | End: 2022-12-24

## 2022-12-24 RX ORDER — DOXYCYCLINE 100 MG/1
100 CAPSULE ORAL 2 TIMES DAILY
Qty: 28 CAPSULE | Refills: 0 | Status: SHIPPED | OUTPATIENT
Start: 2022-12-24 | End: 2023-01-03

## 2022-12-24 RX ORDER — ONDANSETRON 4 MG/1
4 TABLET, ORALLY DISINTEGRATING ORAL ONCE
Status: COMPLETED | OUTPATIENT
Start: 2022-12-24 | End: 2022-12-24

## 2022-12-24 RX ORDER — METHYLPREDNISOLONE SODIUM SUCCINATE 125 MG/2ML
125 INJECTION, POWDER, LYOPHILIZED, FOR SOLUTION INTRAMUSCULAR; INTRAVENOUS ONCE
Status: COMPLETED | OUTPATIENT
Start: 2022-12-24 | End: 2022-12-24

## 2022-12-24 RX ORDER — DIPHENHYDRAMINE HYDROCHLORIDE 50 MG/ML
50 INJECTION INTRAMUSCULAR; INTRAVENOUS ONCE
Status: COMPLETED | OUTPATIENT
Start: 2022-12-24 | End: 2022-12-24

## 2022-12-24 RX ADMIN — METHYLPREDNISOLONE SODIUM SUCCINATE 125 MG: 125 INJECTION, POWDER, FOR SOLUTION INTRAMUSCULAR; INTRAVENOUS at 23:29

## 2022-12-24 RX ADMIN — OXYCODONE HYDROCHLORIDE 5 MG: 5 TABLET ORAL at 23:03

## 2022-12-24 RX ADMIN — IPRATROPIUM BROMIDE AND ALBUTEROL SULFATE 3 ML: 2.5; .5 SOLUTION RESPIRATORY (INHALATION) at 23:26

## 2022-12-24 RX ADMIN — DIPHENHYDRAMINE HYDROCHLORIDE 50 MG: 50 INJECTION, SOLUTION INTRAMUSCULAR; INTRAVENOUS at 23:22

## 2022-12-24 RX ADMIN — AMPICILLIN SODIUM AND SULBACTAM SODIUM 3 G: 2; 1 INJECTION, POWDER, FOR SOLUTION INTRAMUSCULAR; INTRAVENOUS at 22:54

## 2022-12-24 RX ADMIN — FAMOTIDINE 40 MG: 20 TABLET, FILM COATED ORAL at 23:27

## 2022-12-24 RX ADMIN — ONDANSETRON 4 MG: 4 TABLET, ORALLY DISINTEGRATING ORAL at 23:06

## 2022-12-24 ASSESSMENT — ACTIVITIES OF DAILY LIVING (ADL): ADLS_ACUITY_SCORE: 35

## 2022-12-25 VITALS
HEIGHT: 72 IN | BODY MASS INDEX: 24.38 KG/M2 | SYSTOLIC BLOOD PRESSURE: 148 MMHG | RESPIRATION RATE: 16 BRPM | WEIGHT: 180 LBS | HEART RATE: 79 BPM | DIASTOLIC BLOOD PRESSURE: 83 MMHG | TEMPERATURE: 98.7 F | OXYGEN SATURATION: 93 %

## 2022-12-25 ASSESSMENT — ACTIVITIES OF DAILY LIVING (ADL): ADLS_ACUITY_SCORE: 35

## 2022-12-25 NOTE — DISCHARGE INSTRUCTIONS
Please start antibiotics in the morning since you received a dose here. Take full course as prescribed.  We will have some of the doxycycline left over, only take 10 days worth.     For your allergic reaction, take a daily Zyrtec for the next week.  This will help reduce any residual histamine response.  You should no longer ever have penicillin antibiotics due to your anaphylactic reaction today.  I marked this in your chart.    You can use ibuprofen or Tylenol for pain control.  If you have any worsening symptoms as discussed please return to the emergency department right away.    Thank you for choosing [facility]'s Emergency Department. It was a pleasure taking care of you today. If you have any questions, please call [phone].    Lottie Isaacs PA-C

## 2022-12-25 NOTE — ED PROVIDER NOTES
History     Chief Complaint   Patient presents with     Cat Bite       HPI  Reji Choi is a 42 year old male who presents to the emergency department for concerns of a cat bite.  The patient reports he was bathing one of his 6-week-old kittens around 11:30 AM today when it bit him on the distal aspect of the right index finger.  Over the course of the day since then he has noticed increased pain and swelling in the right index finger.  He denies any fevers.  Otherwise at baseline.  Last tetanus 2021.  The kitten is an indoor cat.        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    Past Surgical History:    Past Surgical History:   Procedure Laterality Date     CHOLECYSTECTOMY         Family History:    No family history on file.    Social History:  Marital Status:   [2]  Social History     Tobacco Use     Smoking status: Former     Smokeless tobacco: Never     Tobacco comments:     eight months ago   Substance Use  Topics     Alcohol use: Yes     Comment: occ     Drug use: Yes     Types: Marijuana     Comment: prescription         Medications:    doxycycline monohydrate (MONODOX) 100 MG capsule  metroNIDAZOLE (FLAGYL) 500 MG tablet  colestipol (COLESTID) 1 g tablet  COMPOUNDED NON-CONTROLLED SUBSTANCE (CMPD RX) - PHARMACY TO MIX COMPOUNDED MEDICATION  desonide (DESOWEN) 0.05 % external cream  escitalopram (LEXAPRO) 20 MG tablet  losartan (COZAAR) 100 MG tablet  Melatonin 10 MG TABS tablet  metoprolol succinate ER (TOPROL-XL) 200 MG 24 hr tablet  omeprazole (PRILOSEC) 20 MG DR capsule  oxyCODONE (ROXICODONE) 5 MG tablet          Review of Systems   All other systems reviewed and are negative.        Physical Exam   BP: (!) 148/83  Pulse: 79  Temp: 98.7  F (37.1  C)  Resp: 16  Height: 182.9 cm (6')  Weight: 81.6 kg (180 lb)  SpO2: 98 %      Physical Exam  Vitals and nursing note reviewed.   Constitutional:       General: He is not in acute distress.     Appearance: Normal appearance. He is not ill-appearing, toxic-appearing or diaphoretic.   HENT:      Head: Normocephalic and atraumatic.   Eyes:      Extraocular Movements: Extraocular movements intact.      Conjunctiva/sclera: Conjunctivae normal.   Cardiovascular:      Pulses: Normal pulses.   Pulmonary:      Effort: Pulmonary effort is normal. No respiratory distress.   Musculoskeletal:      Cervical back: Neck supple.      Comments: Right index finger: Small puncture wound on the palmar aspect of the DIP joint that appears closed.  Diffuse swelling and tenderness in the middle and distal phalanges of the finger.  Limited range of motion secondary to pain.  No tenderness or swelling to proximal phalanx or within hand.   Skin:     General: Skin is warm and dry.   Neurological:      General: No focal deficit present.      Mental Status: He is alert and oriented to person, place, and time. Mental status is at baseline.   Psychiatric:         Mood and Affect: Mood normal.          Behavior: Behavior normal.            ED Course        Procedures      No results found for this or any previous visit (from the past 24 hour(s)).    Medications   diphenhydrAMINE (BENADRYL) 50 MG/ML injection (has no administration in time range)   ampicillin-sulbactam (UNASYN) 3 g vial to attach to  mL bag (0 g Intravenous Stopped 12/24/22 2320)   oxyCODONE (ROXICODONE) tablet 5 mg (5 mg Oral Given 12/24/22 2303)   ondansetron (ZOFRAN ODT) ODT tab 4 mg (4 mg Oral Given 12/24/22 2306)   diphenhydrAMINE (BENADRYL) injection 50 mg (50 mg Intravenous Given 12/24/22 2322)   methylPREDNISolone sodium succinate (solu-MEDROL) injection 125 mg (125 mg Intravenous Given 12/24/22 2329)   ipratropium - albuterol 0.5 mg/2.5 mg/3 mL (DUONEB) 0.5-2.5 (3) MG/3ML neb solution (3 mLs  Given 12/24/22 2326)   famotidine (PEPCID) tablet 40 mg (40 mg Oral Given 12/24/22 2327)         Assessments & Plan (with Medical Decision Making)  Reji Choi is a 42 year old male who presented to the ED for concerns of a cat bite to the right index finger sustained earlier today.  He is afebrile on arrival.  Exam revealed tenderness with swelling to the distal and middle phalanges of the right index finger.  No purulent drainage visualized.  Findings are consistent with early infection.  Patient will be given a dose of Unasyn here to get on top of this hopefully.  Received oxycodone with Zofran for pain relief here.  As antibiotics are running, he started develop flushing of the face, itching in the hands, and wheezing with frequent cough.  I went in and assessed patient and findings were consistent with acute anaphylactic reaction to the Unasyn.  Unasyn was stopped, though he did get nearly a full dose.  Administered Benadryl, Solu-Medrol, Pepcid, and a DuoNeb.  On reassessment, patient reported feeling nearly back to baseline.  Somewhat sleepy from Benadryl but otherwise feeling good- wheezing resolved.  I think we should keep an eye on  him for another hour or 2 to ensure no rebound reaction.  Patient will be signed out to Dr. Cadena at shift change.  Plan to discharge home later tonight with doxycycline and Flagyl for treatment of this cat bite if patient remains stable.     I have reviewed the nursing notes.    I have reviewed the findings, diagnosis, plan and need for follow up with the patient.    New Prescriptions    DOXYCYCLINE MONOHYDRATE (MONODOX) 100 MG CAPSULE    Take 1 capsule (100 mg) by mouth 2 times daily for 10 days    METRONIDAZOLE (FLAGYL) 500 MG TABLET    Take 1 tablet (500 mg) by mouth 3 times daily       Final diagnoses:   Infected cat bite of finger, initial encounter   Anaphylaxis, initial encounter     Note: Chart documentation done in part with Dragon Voice Recognition software. Although reviewed after completion, some word and grammatical errors may remain.     12/24/2022   [facility] EMERGENCY DEPT     Lottie Isaacs PA-C  12/24/22 0764

## 2022-12-25 NOTE — ED TRIAGE NOTES
Was giving his 6 week kitten a bath and got bite in the right pointer finger.  Now swelling and pain increasing, happened about 1130 today.  It is his kitten indoors  States his tetanus status should be current

## 2023-02-10 ENCOUNTER — HOSPITAL ENCOUNTER (EMERGENCY)
Facility: CLINIC | Age: 43
Discharge: HOME | End: 2023-02-10
Attending: EMERGENCY MEDICINE | Admitting: EMERGENCY MEDICINE
Payer: COMMERCIAL

## 2023-02-10 ENCOUNTER — APPOINTMENT (OUTPATIENT)
Dept: GENERAL RADIOLOGY | Facility: CLINIC | Age: 43
End: 2023-02-10
Attending: EMERGENCY MEDICINE
Payer: COMMERCIAL

## 2023-02-10 VITALS
TEMPERATURE: 98.4 F | DIASTOLIC BLOOD PRESSURE: 106 MMHG | BODY MASS INDEX: 23.06 KG/M2 | HEART RATE: 72 BPM | SYSTOLIC BLOOD PRESSURE: 140 MMHG | RESPIRATION RATE: 16 BRPM | WEIGHT: 170 LBS | OXYGEN SATURATION: 99 %

## 2023-02-10 DIAGNOSIS — R07.89 CHEST TIGHTNESS: ICD-10-CM

## 2023-02-10 DIAGNOSIS — R06.09 DYSPNEA ON EXERTION: ICD-10-CM

## 2023-02-10 LAB
ALBUMIN SERPL BCG-MCNC: 4.8 G/DL (ref 3.5–5.2)
ALP SERPL-CCNC: 59 U/L (ref 40–129)
ALT SERPL W P-5'-P-CCNC: 29 U/L (ref 10–50)
ANION GAP SERPL CALCULATED.3IONS-SCNC: 16 MMOL/L (ref 7–15)
AST SERPL W P-5'-P-CCNC: 26 U/L (ref 10–50)
BASOPHILS # BLD AUTO: 0 10E3/UL (ref 0–0.2)
BASOPHILS NFR BLD AUTO: 1 %
BILIRUB SERPL-MCNC: 0.7 MG/DL
BUN SERPL-MCNC: 11.3 MG/DL (ref 6–20)
CALCIUM SERPL-MCNC: 9.4 MG/DL (ref 8.6–10)
CHLORIDE SERPL-SCNC: 98 MMOL/L (ref 98–107)
CREAT SERPL-MCNC: 0.75 MG/DL (ref 0.67–1.17)
D DIMER PPP FEU-MCNC: 0.27 UG/ML FEU (ref 0–0.5)
DEPRECATED HCO3 PLAS-SCNC: 22 MMOL/L (ref 22–29)
EOSINOPHIL # BLD AUTO: 0.2 10E3/UL (ref 0–0.7)
EOSINOPHIL NFR BLD AUTO: 2 %
ERYTHROCYTE [DISTWIDTH] IN BLOOD BY AUTOMATED COUNT: 12.5 % (ref 10–15)
GFR SERPL CREATININE-BSD FRML MDRD: >90 ML/MIN/1.73M2
GLUCOSE SERPL-MCNC: 152 MG/DL (ref 70–99)
HCT VFR BLD AUTO: 40.3 % (ref 40–53)
HGB BLD-MCNC: 13.9 G/DL (ref 13.3–17.7)
IMM GRANULOCYTES # BLD: 0 10E3/UL
IMM GRANULOCYTES NFR BLD: 0 %
LYMPHOCYTES # BLD AUTO: 2.3 10E3/UL (ref 0.8–5.3)
LYMPHOCYTES NFR BLD AUTO: 28 %
MCH RBC QN AUTO: 30.5 PG (ref 26.5–33)
MCHC RBC AUTO-ENTMCNC: 34.5 G/DL (ref 31.5–36.5)
MCV RBC AUTO: 88 FL (ref 78–100)
MONOCYTES # BLD AUTO: 0.6 10E3/UL (ref 0–1.3)
MONOCYTES NFR BLD AUTO: 7 %
NEUTROPHILS # BLD AUTO: 5.2 10E3/UL (ref 1.6–8.3)
NEUTROPHILS NFR BLD AUTO: 62 %
NRBC # BLD AUTO: 0 10E3/UL
NRBC BLD AUTO-RTO: 0 /100
PLATELET # BLD AUTO: 357 10E3/UL (ref 150–450)
POTASSIUM SERPL-SCNC: 3.6 MMOL/L (ref 3.4–5.3)
PROT SERPL-MCNC: 7.6 G/DL (ref 6.4–8.3)
RBC # BLD AUTO: 4.56 10E6/UL (ref 4.4–5.9)
SODIUM SERPL-SCNC: 136 MMOL/L (ref 136–145)
TROPONIN T SERPL HS-MCNC: 9 NG/L
WBC # BLD AUTO: 8.3 10E3/UL (ref 4–11)

## 2023-02-10 PROCEDURE — 99285 EMERGENCY DEPT VISIT HI MDM: CPT | Mod: 25 | Performed by: EMERGENCY MEDICINE

## 2023-02-10 PROCEDURE — 99284 EMERGENCY DEPT VISIT MOD MDM: CPT | Performed by: EMERGENCY MEDICINE

## 2023-02-10 PROCEDURE — 85379 FIBRIN DEGRADATION QUANT: CPT | Performed by: EMERGENCY MEDICINE

## 2023-02-10 PROCEDURE — 84484 ASSAY OF TROPONIN QUANT: CPT | Performed by: EMERGENCY MEDICINE

## 2023-02-10 PROCEDURE — 36415 COLL VENOUS BLD VENIPUNCTURE: CPT | Performed by: EMERGENCY MEDICINE

## 2023-02-10 PROCEDURE — 71046 X-RAY EXAM CHEST 2 VIEWS: CPT

## 2023-02-10 PROCEDURE — 85025 COMPLETE CBC W/AUTO DIFF WBC: CPT | Performed by: EMERGENCY MEDICINE

## 2023-02-10 PROCEDURE — 80053 COMPREHEN METABOLIC PANEL: CPT | Performed by: EMERGENCY MEDICINE

## 2023-02-10 PROCEDURE — 93005 ELECTROCARDIOGRAM TRACING: CPT | Performed by: EMERGENCY MEDICINE

## 2023-02-10 RX ORDER — ONDANSETRON 2 MG/ML
4 INJECTION INTRAMUSCULAR; INTRAVENOUS EVERY 30 MIN PRN
Status: DISCONTINUED | OUTPATIENT
Start: 2023-02-10 | End: 2023-02-10 | Stop reason: HOSPADM

## 2023-02-10 ASSESSMENT — ACTIVITIES OF DAILY LIVING (ADL): ADLS_ACUITY_SCORE: 35

## 2023-02-10 NOTE — DISCHARGE INSTRUCTIONS
Follow-up with your primary care provider.    Okay to take your anxiety medication as needed.    You can try over-the-counter medications or Biofreeze over the area of pain.    Return at anytime for worsening, changes or concerns.    I hope that this improves quickly!!

## 2023-02-10 NOTE — ED NOTES
Patient reports history of anxiety but this feels a little different. He reports chest tightness across L) chest radiating across. He states some nausea. He appears anxious. Mirta Mckay RN

## 2023-02-10 NOTE — ED PROVIDER NOTES
"  History     Chief Complaint   Patient presents with     Chest Pain     HPI  History per patient, medical records    This is a 42-year-old male, history of GERD, TRISHA, hypertension, presenting with chest pain.  Patient was doing some work in his home, pulling down boards, when he had the onset of tightness in his chest along with feeling of shortness of breath.  He felt some \"hollow pounding\".  He acknowledges having anxiety but he notes this feels different than usual.  He denies any cold or cough symptoms.  No fevers or chills.  He has been nauseated.  No other bowel or bladder changes.  No weakness or dizziness.  No calf pain or swelling.  He is on hypertensive medications, no cholesterol medications.  He quit smoking at age 35.  He has not taken anything for his symptoms.    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 No pertinent past medical history.    Past Surgical History:    Past Surgical History:   Procedure Laterality Date     " CHOLECYSTECTOMY         Family History:    No family history on file.    Social History:  Marital Status:   [2]  Social History     Tobacco Use     Smoking status: Former     Smokeless tobacco: Never     Tobacco comments:     eight months ago   Substance Use Topics     Alcohol use: Yes     Comment: occ     Drug use: Yes     Types: Marijuana     Comment: prescription         Medications:    colestipol (COLESTID) 1 g tablet  COMPOUNDED NON-CONTROLLED SUBSTANCE (CMPD RX) - PHARMACY TO MIX COMPOUNDED MEDICATION  desonide (DESOWEN) 0.05 % external cream  escitalopram (LEXAPRO) 20 MG tablet  losartan (COZAAR) 100 MG tablet  Melatonin 10 MG TABS tablet  metoprolol succinate ER (TOPROL-XL) 200 MG 24 hr tablet  metroNIDAZOLE (FLAGYL) 500 MG tablet  omeprazole (PRILOSEC) 20 MG DR capsule  oxyCODONE (ROXICODONE) 5 MG tablet          Review of Systems   All other ROS reviewed and are negative or non-contributory except as stated in HPI.     Physical Exam   BP: (!) 155/102  Pulse: 75  Temp: 98.4  F (36.9  C)  Resp: 16  Weight: 77.1 kg (170 lb)  SpO2: 98 %      Physical Exam  Vitals and nursing note reviewed.   Constitutional:       Appearance: He is well-developed and normal weight.      Comments: Healthy-appearing male sitting in the bed.  Anxious.   HENT:      Head: Normocephalic.      Nose: Nose normal.   Eyes:      Extraocular Movements: Extraocular movements intact.      Comments: Bilateral conjunctival injection   Cardiovascular:      Rate and Rhythm: Normal rate and regular rhythm.      Pulses: Normal pulses.      Heart sounds: Normal heart sounds.   Pulmonary:      Effort: Pulmonary effort is normal.      Comments: Occasional end expiratory wheeze/squeak  Abdominal:      Palpations: Abdomen is soft.      Tenderness: There is no abdominal tenderness.   Musculoskeletal:         General: No tenderness. Normal range of motion.      Cervical back: Normal range of motion and neck supple.      Right lower leg: No edema.       Left lower leg: No edema.   Skin:     General: Skin is warm and dry.   Neurological:      General: No focal deficit present.      Mental Status: He is alert and oriented to person, place, and time.   Psychiatric:         Mood and Affect: Mood is anxious.         Behavior: Behavior normal.         ED Course (with Medical Decision Making)    Pt seen and examined by me.  RN and EPIC notes reviewed.      Patient with 1 day of chest tightness symptoms while he was pulling some boards possible cardiac cause with angina, musculoskeletal,, other.  On exam, patient is mildly hypertensive.  Lung sounds are normal except for an occasional tiny squeak.  Plan EKG, x-ray versus CT    EKG shows normal sinus rhythm with a rate of 69.  There is no ectopy.  No ST segment abnormalities.  This was read by myself at 08 100.  No old EKG to compare.    Patient has normal ED, troponin, D-dimer, CBC.  Chest x-ray was done.  There is no obvious endings.    Results discussed with the patient.  I am guessing this is actually musculoskeletal pain.  He can use over-the-counter medications such as Tylenol or ibuprofen.  Could take his antianxiety medication.  Can try patch Biofreeze.  I would like him to follow-up with her primary care provider though and last 2 days.  Discuss stress test.  In the meantime if he has any significant worsening, changes or concerns return at any time.  I did recheck patient in no further squeaking noted on exam.      Procedures    Results for orders placed or performed during the hospital encounter of 02/10/23 (from the past 24 hour(s))   CBC with platelets differential    Narrative    The following orders were created for panel order CBC with platelets differential.  Procedure                               Abnormality         Status                     ---------                               -----------         ------                     CBC with platelets and d...[[id number]]                      Final result                  Please view results for these tests on the individual orders.   Comprehensive metabolic panel   Result Value Ref Range    Sodium 136 136 - 145 mmol/L    Potassium 3.6 3.4 - 5.3 mmol/L    Chloride 98 98 - 107 mmol/L    Carbon Dioxide (CO2) 22 22 - 29 mmol/L    Anion Gap 16 (H) 7 - 15 mmol/L    Urea Nitrogen 11.3 6.0 - 20.0 mg/dL    Creatinine 0.75 0.67 - 1.17 mg/dL    Calcium 9.4 8.6 - 10.0 mg/dL    Glucose 152 (H) 70 - 99 mg/dL    Alkaline Phosphatase 59 40 - 129 U/L    AST 26 10 - 50 U/L    ALT 29 10 - 50 U/L    Protein Total 7.6 6.4 - 8.3 g/dL    Albumin 4.8 3.5 - 5.2 g/dL    Bilirubin Total 0.7 <=1.2 mg/dL    GFR Estimate >90 >60 mL/min/1.73m2   D dimer quantitative   Result Value Ref Range    D-Dimer Quantitative 0.27 0.00 - 0.50 ug/mL FEU    Narrative    This D-dimer assay is intended for use in conjunction with a clinical pretest probability assessment model to exclude pulmonary embolism (PE) and deep venous thrombosis (DVT) in outpatients suspected of PE or DVT. The cut-off value is 0.50 ug/mL FEU.   Troponin T, High Sensitivity   Result Value Ref Range    Troponin T, High Sensitivity 9 <=22 ng/L   CBC with platelets and differential   Result Value Ref Range    WBC Count 8.3 4.0 - 11.0 10e3/uL    RBC Count 4.56 4.40 - 5.90 10e6/uL    Hemoglobin 13.9 13.3 - 17.7 g/dL    Hematocrit 40.3 40.0 - 53.0 %    MCV 88 78 - 100 fL    MCH 30.5 26.5 - 33.0 pg    MCHC 34.5 31.5 - 36.5 g/dL    RDW 12.5 10.0 - 15.0 %    Platelet Count 357 150 - 450 10e3/uL    % Neutrophils 62 %    % Lymphocytes 28 %    % Monocytes 7 %    % Eosinophils 2 %    % Basophils 1 %    % Immature Granulocytes 0 %    NRBCs per 100 WBC 0 <1 /100    Absolute Neutrophils 5.2 1.6 - 8.3 10e3/uL    Absolute Lymphocytes 2.3 0.8 - 5.3 10e3/uL    Absolute Monocytes 0.6 0.0 - 1.3 10e3/uL    Absolute Eosinophils 0.2 0.0 - 0.7 10e3/uL    Absolute Basophils 0.0 0.0 - 0.2 10e3/uL    Absolute Immature Granulocytes 0.0 <=0.4 10e3/uL    Absolute NRBCs  0.0 10e3/uL   XR Chest 2 Views    Narrative    CHEST TWO VIEWS    2/10/2023 9:01 AM     HISTORY: Chest pain/short of air.     COMPARISON: CT abdomen and pelvis dated 11/2/2021    FINDINGS:  The lungs are clear. No pleural effusions or pneumothorax.  Heart size and pulmonary vascularity are within normal limits. No  acute osseous fracture. Specifically, no evidence for left rib  fracture seen on this study.      Impression    IMPRESSION: No evidence of acute cardiopulmonary disease is seen.  Etiology for patient's chest pain and shortness of air is not  definitely identified on this study.    WANDA GARCIA MD         SYSTEM ID:  [id number]       Medications - No data to display    Assessments & Plan      I have reviewed the findings, diagnosis, plan and need for follow up with the patient.    Discharge Medication List as of 2/10/2023  9:27 AM          Final diagnoses:   Chest tightness   Dyspnea on exertion     Disposition: Patient discharged home in stable condition.  Plan as above.  Return for concerns.     Note: Chart documentation done in part with Dragon Voice Recognition software. Although reviewed after completion, some word and grammatical errors may remain.     2/10/2023   [facility] EMERGENCY DEPT     Shima Shin MD  02/11/23 0204

## 2023-02-10 NOTE — ED TRIAGE NOTES
Patient c/o 4/10 chest pain and shortness of breath since yesterday. He states he also has a history of anxiety and hes not sure if anxiety is causing his symptoms.     Triage Assessment     Row Name 02/10/23 0749       Triage Assessment (Adult)    Airway WDL WDL       Respiratory WDL    Respiratory WDL WDL       Skin Circulation/Temperature WDL    Skin Circulation/Temperature WDL WDL       Cardiac WDL    Cardiac WDL X;chest pain

## 2023-05-08 ENCOUNTER — APPOINTMENT (OUTPATIENT)
Dept: CT IMAGING | Facility: CLINIC | Age: 43
End: 2023-05-08
Attending: EMERGENCY MEDICINE
Payer: COMMERCIAL

## 2023-05-08 ENCOUNTER — HOSPITAL ENCOUNTER (EMERGENCY)
Facility: CLINIC | Age: 43
Discharge: HOME | End: 2023-05-08
Attending: EMERGENCY MEDICINE | Admitting: EMERGENCY MEDICINE
Payer: COMMERCIAL

## 2023-05-08 VITALS
TEMPERATURE: 98.3 F | DIASTOLIC BLOOD PRESSURE: 103 MMHG | WEIGHT: 175 LBS | BODY MASS INDEX: 23.73 KG/M2 | RESPIRATION RATE: 14 BRPM | SYSTOLIC BLOOD PRESSURE: 168 MMHG | HEART RATE: 78 BPM | OXYGEN SATURATION: 100 %

## 2023-05-08 DIAGNOSIS — K57.32 SIGMOID DIVERTICULITIS: ICD-10-CM

## 2023-05-08 LAB
ALBUMIN SERPL BCG-MCNC: 4.4 G/DL (ref 3.5–5.2)
ALBUMIN UR-MCNC: NEGATIVE MG/DL
ALP SERPL-CCNC: 51 U/L (ref 40–129)
ALT SERPL W P-5'-P-CCNC: 22 U/L (ref 10–50)
ANION GAP SERPL CALCULATED.3IONS-SCNC: 11 MMOL/L (ref 7–15)
APPEARANCE UR: CLEAR
AST SERPL W P-5'-P-CCNC: 20 U/L (ref 10–50)
BASOPHILS # BLD AUTO: 0.1 10E3/UL (ref 0–0.2)
BASOPHILS NFR BLD AUTO: 1 %
BILIRUB SERPL-MCNC: 0.2 MG/DL
BILIRUB UR QL STRIP: NEGATIVE
BUN SERPL-MCNC: 8.1 MG/DL (ref 6–20)
CALCIUM SERPL-MCNC: 8.9 MG/DL (ref 8.6–10)
CHLORIDE SERPL-SCNC: 103 MMOL/L (ref 98–107)
COLOR UR AUTO: YELLOW
CREAT SERPL-MCNC: 0.74 MG/DL (ref 0.67–1.17)
DEPRECATED HCO3 PLAS-SCNC: 26 MMOL/L (ref 22–29)
EOSINOPHIL # BLD AUTO: 0.3 10E3/UL (ref 0–0.7)
EOSINOPHIL NFR BLD AUTO: 4 %
ERYTHROCYTE [DISTWIDTH] IN BLOOD BY AUTOMATED COUNT: 12.1 % (ref 10–15)
GFR SERPL CREATININE-BSD FRML MDRD: >90 ML/MIN/1.73M2
GLUCOSE SERPL-MCNC: 117 MG/DL (ref 70–99)
GLUCOSE UR STRIP-MCNC: NEGATIVE MG/DL
HCT VFR BLD AUTO: 35.1 % (ref 40–53)
HGB BLD-MCNC: 12.1 G/DL (ref 13.3–17.7)
HGB UR QL STRIP: ABNORMAL
IMM GRANULOCYTES # BLD: 0 10E3/UL
IMM GRANULOCYTES NFR BLD: 0 %
KETONES UR STRIP-MCNC: NEGATIVE MG/DL
LEUKOCYTE ESTERASE UR QL STRIP: NEGATIVE
LYMPHOCYTES # BLD AUTO: 1.8 10E3/UL (ref 0.8–5.3)
LYMPHOCYTES NFR BLD AUTO: 26 %
MCH RBC QN AUTO: 30.7 PG (ref 26.5–33)
MCHC RBC AUTO-ENTMCNC: 34.5 G/DL (ref 31.5–36.5)
MCV RBC AUTO: 89 FL (ref 78–100)
MONOCYTES # BLD AUTO: 0.6 10E3/UL (ref 0–1.3)
MONOCYTES NFR BLD AUTO: 8 %
MUCOUS THREADS #/AREA URNS LPF: PRESENT /LPF
NEUTROPHILS # BLD AUTO: 4.2 10E3/UL (ref 1.6–8.3)
NEUTROPHILS NFR BLD AUTO: 61 %
NITRATE UR QL: NEGATIVE
NRBC # BLD AUTO: 0 10E3/UL
NRBC BLD AUTO-RTO: 0 /100
PH UR STRIP: 5 [PH] (ref 5–7)
PLATELET # BLD AUTO: 308 10E3/UL (ref 150–450)
POTASSIUM SERPL-SCNC: 3.9 MMOL/L (ref 3.4–5.3)
PROT SERPL-MCNC: 6.9 G/DL (ref 6.4–8.3)
RBC # BLD AUTO: 3.94 10E6/UL (ref 4.4–5.9)
RBC URINE: 0 /HPF
SODIUM SERPL-SCNC: 140 MMOL/L (ref 136–145)
SP GR UR STRIP: 1.01 (ref 1–1.03)
SQUAMOUS EPITHELIAL: <1 /HPF
UROBILINOGEN UR STRIP-MCNC: NORMAL MG/DL
WBC # BLD AUTO: 6.9 10E3/UL (ref 4–11)
WBC URINE: 0 /HPF

## 2023-05-08 PROCEDURE — 80053 COMPREHEN METABOLIC PANEL: CPT | Performed by: EMERGENCY MEDICINE

## 2023-05-08 PROCEDURE — 250N000011 HC RX IP 250 OP 636: Performed by: EMERGENCY MEDICINE

## 2023-05-08 PROCEDURE — 99285 EMERGENCY DEPT VISIT HI MDM: CPT | Mod: 25

## 2023-05-08 PROCEDURE — 96374 THER/PROPH/DIAG INJ IV PUSH: CPT | Mod: 59

## 2023-05-08 PROCEDURE — 96375 TX/PRO/DX INJ NEW DRUG ADDON: CPT

## 2023-05-08 PROCEDURE — 250N000009 HC RX 250: Performed by: EMERGENCY MEDICINE

## 2023-05-08 PROCEDURE — 258N000003 HC RX IP 258 OP 636: Performed by: EMERGENCY MEDICINE

## 2023-05-08 PROCEDURE — 81001 URINALYSIS AUTO W/SCOPE: CPT | Performed by: EMERGENCY MEDICINE

## 2023-05-08 PROCEDURE — 96376 TX/PRO/DX INJ SAME DRUG ADON: CPT

## 2023-05-08 PROCEDURE — 96361 HYDRATE IV INFUSION ADD-ON: CPT

## 2023-05-08 PROCEDURE — 99285 EMERGENCY DEPT VISIT HI MDM: CPT | Performed by: EMERGENCY MEDICINE

## 2023-05-08 PROCEDURE — 36415 COLL VENOUS BLD VENIPUNCTURE: CPT | Performed by: EMERGENCY MEDICINE

## 2023-05-08 PROCEDURE — 85025 COMPLETE CBC W/AUTO DIFF WBC: CPT | Performed by: EMERGENCY MEDICINE

## 2023-05-08 PROCEDURE — 74177 CT ABD & PELVIS W/CONTRAST: CPT

## 2023-05-08 RX ORDER — ONDANSETRON 4 MG/1
4 TABLET, ORALLY DISINTEGRATING ORAL EVERY 6 HOURS PRN
Qty: 20 TABLET | Refills: 0 | Status: SHIPPED | OUTPATIENT
Start: 2023-05-08 | End: 2023-05-13

## 2023-05-08 RX ORDER — OXYCODONE HYDROCHLORIDE 5 MG/1
5 TABLET ORAL EVERY 6 HOURS PRN
Qty: 12 TABLET | Refills: 0 | Status: SHIPPED | OUTPATIENT
Start: 2023-05-08 | End: 2023-05-11

## 2023-05-08 RX ORDER — IBUPROFEN 200 MG
800 TABLET ORAL EVERY 8 HOURS PRN
COMMUNITY

## 2023-05-08 RX ORDER — METRONIDAZOLE 500 MG/1
500 TABLET ORAL 3 TIMES DAILY
Qty: 21 TABLET | Refills: 0 | Status: SHIPPED | OUTPATIENT
Start: 2023-05-08 | End: 2023-05-15

## 2023-05-08 RX ORDER — SODIUM CHLORIDE 9 MG/ML
INJECTION, SOLUTION INTRAVENOUS CONTINUOUS
Status: DISCONTINUED | OUTPATIENT
Start: 2023-05-08 | End: 2023-05-08 | Stop reason: HOSPADM

## 2023-05-08 RX ORDER — ONDANSETRON 2 MG/ML
4 INJECTION INTRAMUSCULAR; INTRAVENOUS EVERY 30 MIN PRN
Status: DISCONTINUED | OUTPATIENT
Start: 2023-05-08 | End: 2023-05-08 | Stop reason: HOSPADM

## 2023-05-08 RX ORDER — IOPAMIDOL 755 MG/ML
500 INJECTION, SOLUTION INTRAVASCULAR ONCE
Status: COMPLETED | OUTPATIENT
Start: 2023-05-08 | End: 2023-05-08

## 2023-05-08 RX ORDER — HYDROMORPHONE HYDROCHLORIDE 1 MG/ML
0.5 INJECTION, SOLUTION INTRAMUSCULAR; INTRAVENOUS; SUBCUTANEOUS
Status: COMPLETED | OUTPATIENT
Start: 2023-05-08 | End: 2023-05-08

## 2023-05-08 RX ORDER — ACETAMINOPHEN 500 MG
1000 TABLET ORAL EVERY 4 HOURS PRN
COMMUNITY

## 2023-05-08 RX ORDER — CIPROFLOXACIN 500 MG/1
500 TABLET, FILM COATED ORAL 2 TIMES DAILY
Qty: 14 TABLET | Refills: 0 | Status: SHIPPED | OUTPATIENT
Start: 2023-05-08 | End: 2023-09-14

## 2023-05-08 RX ADMIN — ONDANSETRON 4 MG: 2 INJECTION INTRAMUSCULAR; INTRAVENOUS at 16:59

## 2023-05-08 RX ADMIN — IOPAMIDOL 85 ML: 755 INJECTION, SOLUTION INTRAVENOUS at 17:09

## 2023-05-08 RX ADMIN — HYDROMORPHONE HYDROCHLORIDE 0.5 MG: 1 INJECTION, SOLUTION INTRAMUSCULAR; INTRAVENOUS; SUBCUTANEOUS at 18:05

## 2023-05-08 RX ADMIN — SODIUM CHLORIDE 1000 ML: 9 INJECTION, SOLUTION INTRAVENOUS at 16:48

## 2023-05-08 RX ADMIN — SODIUM CHLORIDE 60 ML: 9 INJECTION, SOLUTION INTRAVENOUS at 17:09

## 2023-05-08 RX ADMIN — HYDROMORPHONE HYDROCHLORIDE 0.5 MG: 1 INJECTION, SOLUTION INTRAMUSCULAR; INTRAVENOUS; SUBCUTANEOUS at 16:59

## 2023-05-08 ASSESSMENT — ACTIVITIES OF DAILY LIVING (ADL): ADLS_ACUITY_SCORE: 35

## 2023-05-08 NOTE — ED TRIAGE NOTES
Left to mid abdominal pain for 4 days.  Nausea,          Triage Assessment     Row Name 05/08/23 1615       Triage Assessment (Adult)    Airway WDL WDL       Respiratory WDL    Respiratory WDL WDL

## 2023-05-08 NOTE — ED PROVIDER NOTES
History     Chief Complaint   Patient presents with     Abdominal Pain     HPI  Reji Choi is a 43 year old male who presents to the emergency room with left-sided abdominal pain.  Symptoms have been present for the last 4 days.  No inciting event to trigger this pain, and nothing seems to make it better or worse.  He has tried taking oral ibuprofen and Tylenol available over-the-counter without much relief.  Does have some nausea but no vomiting.  Feels like he has not been passing as much stool as he typically does, but not noting any straining to go, no bloody or dark black stools.  Has not been running a fever.  He states that he knows he has diverticular disease from a colonoscopy, and has had similar pain in the past, but this pain has gotten significantly worse.    Allergies:  Allergies   Allergen Reactions     Ampicillin-Sulbactam Sodium Anaphylaxis     Clindamycin Other (See Comments)     Canker sores in mouth     Ketorolac Other (See Comments)     Severe agitation     Pravastatin Muscle Pain (Myalgia)     Liver stress     Rosuvastatin Muscle Pain (Myalgia)     Liver stress     Simvastatin Muscle Pain (Myalgia)     Liver stress       Problem List:    Patient Active Problem List    Diagnosis Date Noted     Controlled substance agreement signed 02/01/2018     Priority: Medium     for tramadol       Chronic neck pain 10/03/2017     Priority: Medium     Biliary dyskinesia 08/29/2017     Priority: Medium     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    Past Surgical History:    Past Surgical History:   Procedure Laterality Date     CHOLECYSTECTOMY         Family History:    No family history on file.    Social History:  Marital Status:   [2]  Social History     Tobacco Use     Smoking status: Former     Smokeless tobacco: Never     Tobacco comments:     eight months ago   Substance Use Topics     Alcohol use: Yes     Comment: occ     Drug use: Yes     Types: Marijuana     Comment: prescription         Medications:    acetaminophen (TYLENOL) 500 MG tablet  ciprofloxacin (CIPRO) 500 MG tablet  colestipol (COLESTID) 1 g tablet  desonide (DESOWEN) 0.05 % external cream  escitalopram (LEXAPRO) 20 MG tablet  ibuprofen (ADVIL/MOTRIN) 200 MG tablet  losartan (COZAAR) 100 MG tablet  metoprolol succinate ER (TOPROL-XL) 200 MG 24 hr tablet  metroNIDAZOLE (FLAGYL) 500 MG tablet  omeprazole (PRILOSEC) 20 MG DR capsule  ondansetron (ZOFRAN ODT) 4 MG ODT tab  oxyCODONE (ROXICODONE) 5 MG tablet          Review of Systems   All other systems reviewed and are negative.      Physical Exam   BP: (!) 168/103  Pulse: 78  Temp: 98.3  F (36.8  C)  Resp: 14  Weight: 79.4 kg (175 lb)  SpO2: 100 %      Physical Exam  Vitals and nursing note reviewed.   Constitutional:       General: He is not in acute distress.     Appearance: Normal appearance. He is not toxic-appearing.   HENT:      Head: Atraumatic.      Nose: No congestion.   Eyes:      General: No scleral icterus.     Conjunctiva/sclera: Conjunctivae normal.   Cardiovascular:      Rate and Rhythm: Normal rate.   Pulmonary:      Effort: Pulmonary effort is normal. No respiratory distress.   Abdominal:      General: Abdomen is flat. Bowel sounds are normal.      Palpations: Abdomen is soft.      Tenderness: There is abdominal tenderness in the periumbilical area, suprapubic area and left lower quadrant. There is guarding.      Comments: Voluntary guarding, no rigidity, no  rebound   Musculoskeletal:         General: No swelling or tenderness.      Cervical back: Neck supple. No tenderness.   Lymphadenopathy:      Cervical: No cervical adenopathy.   Skin:     General: Skin is warm.      Capillary Refill: Capillary refill takes less than 2 seconds.      Findings: No rash.   Neurological:      Mental Status: He is alert.         ED Course                 Procedures              Critical Care time:  none               Results for orders placed or performed during the hospital encounter of 05/08/23 (from the past 24 hour(s))   CBC with platelets differential    Narrative    The following orders were created for panel order CBC with platelets differential.  Procedure                               Abnormality         Status                     ---------                               -----------         ------                     CBC with platelets and d...[[id number]]  Abnormal            Final result                 Please view results for these tests on the individual orders.   Comprehensive metabolic panel   Result Value Ref Range    Sodium 140 136 - 145 mmol/L    Potassium 3.9 3.4 - 5.3 mmol/L    Chloride 103 98 - 107 mmol/L    Carbon Dioxide (CO2) 26 22 - 29 mmol/L    Anion Gap 11 7 - 15 mmol/L    Urea Nitrogen 8.1 6.0 - 20.0 mg/dL    Creatinine 0.74 0.67 - 1.17 mg/dL    Calcium 8.9 8.6 - 10.0 mg/dL    Glucose 117 (H) 70 - 99 mg/dL    Alkaline Phosphatase 51 40 - 129 U/L    AST 20 10 - 50 U/L    ALT 22 10 - 50 U/L    Protein Total 6.9 6.4 - 8.3 g/dL    Albumin 4.4 3.5 - 5.2 g/dL    Bilirubin Total 0.2 <=1.2 mg/dL    GFR Estimate >90 >60 mL/min/1.73m2   CBC with platelets and differential   Result Value Ref Range    WBC Count 6.9 4.0 - 11.0 10e3/uL    RBC Count 3.94 (L) 4.40 - 5.90 10e6/uL    Hemoglobin 12.1 (L) 13.3 - 17.7 g/dL    Hematocrit 35.1 (L) 40.0 - 53.0 %    MCV 89 78 - 100 fL    MCH 30.7 26.5 - 33.0 pg    MCHC 34.5 31.5 - 36.5 g/dL    RDW 12.1 10.0 - 15.0 %    Platelet Count  308 150 - 450 10e3/uL    % Neutrophils 61 %    % Lymphocytes 26 %    % Monocytes 8 %    % Eosinophils 4 %    % Basophils 1 %    % Immature Granulocytes 0 %    NRBCs per 100 WBC 0 <1 /100    Absolute Neutrophils 4.2 1.6 - 8.3 10e3/uL    Absolute Lymphocytes 1.8 0.8 - 5.3 10e3/uL    Absolute Monocytes 0.6 0.0 - 1.3 10e3/uL    Absolute Eosinophils 0.3 0.0 - 0.7 10e3/uL    Absolute Basophils 0.1 0.0 - 0.2 10e3/uL    Absolute Immature Granulocytes 0.0 <=0.4 10e3/uL    Absolute NRBCs 0.0 10e3/uL   UA with Microscopic reflex to Culture    Specimen: Urine, Clean Catch   Result Value Ref Range    Color Urine Yellow Colorless, Straw, Light Yellow, Yellow    Appearance Urine Clear Clear    Glucose Urine Negative Negative mg/dL    Bilirubin Urine Negative Negative    Ketones Urine Negative Negative mg/dL    Specific Gravity Urine 1.015 1.003 - 1.035    Blood Urine Small (A) Negative    pH Urine 5.0 5.0 - 7.0    Protein Albumin Urine Negative Negative mg/dL    Urobilinogen Urine Normal Normal, 2.0 mg/dL    Nitrite Urine Negative Negative    Leukocyte Esterase Urine Negative Negative    Mucus Urine Present (A) None Seen /LPF    RBC Urine 0 <=2 /HPF    WBC Urine 0 <=5 /HPF    Squamous Epithelials Urine <1 <=1 /HPF    Narrative    Urine Culture not indicated   CT Abdomen Pelvis w Contrast    Narrative    EXAM: CT ABDOMEN PELVIS W CONTRAST  LOCATION: [facility]  DATE/TIME: 5/8/2023 5:19 PM CDT    INDICATION: Left-sided abdominal pain, known diverticular disease.  COMPARISON: 11/02/2021  TECHNIQUE: CT scan of the abdomen and pelvis was performed following injection of IV contrast. Multiplanar reformats were obtained. Dose reduction techniques were used.  CONTRAST: 85 ml Isovue  370    FINDINGS:   LOWER CHEST: Normal.    HEPATOBILIARY: Cholecystectomy.    PANCREAS: Normal.    SPLEEN: Normal.    ADRENAL GLANDS: Normal.    KIDNEYS/BLADDER: Normal.    BOWEL: Minimal changes of acute diverticulitis lower  descending colon. Findings are quite subtle.    LYMPH NODES: Normal.    VASCULATURE: Unremarkable.    PELVIC ORGANS: Normal.    MUSCULOSKELETAL: Normal.      Impression    IMPRESSION: Very minimal changes of acute diverticulitis involving the lower descending colon. The acute changes of diverticulitis seen previously in the upper sigmoid colon have resolved. No abscess.       Medications   0.9% sodium chloride BOLUS (0 mLs Intravenous Stopped 5/8/23 1805)     Followed by   sodium chloride 0.9% infusion (has no administration in time range)   ondansetron (ZOFRAN) injection 4 mg (4 mg Intravenous $Given 5/8/23 1659)   HYDROmorphone (PF) (DILAUDID) injection 0.5 mg (0.5 mg Intravenous $Given 5/8/23 1659)   100 mL saline bag CT (60 mLs Intravenous $Given 5/8/23 1709)   iopamidol (ISOVUE-370) solution 500 mL (85 mLs Intravenous $Given 5/8/23 1709)   HYDROmorphone (PF) (DILAUDID) injection 0.5 mg (0.5 mg Intravenous $Given 5/8/23 1805)       Assessments & Plan (with Medical Decision Making)  Reji is a 43-year-old male presenting to the emergency room for concern of left-sided abdominal pain and possible diverticulitis.  See history and focused physical exam as above  Pleasant 43-year-old male in moderate distress secondary to pain, is hypertensive but otherwise vitally stable and afebrile.  He does have tenderness with voluntary guarding when palpating left side of his abdomen especially in the lower quadrant and suprapubic and umbilical region.  No masses were appreciated, normal bowel sounds throughout.  We will get a CT scan to evaluate for presumed diverticulitis and to rule out abscess or perforation.  We will also give IV pain medication since he states his wife would be able to give him a safe ride home.  We will check lab work and get a urine sample as well  Labs and imaging as above.  He does have evidence of mild acute sigmoid diverticulitis without evidence of abscess, perforation, or obstruction.  Lab work  was generally within normal limits.  He did have relief with the IV Dilaudid for pain, and felt his nausea was significantly improved after IV Zofran.  Had a discussion with the patient about new recommendations related to watchful waiting and no antibiotic treatment versus antibiotic treatment for acute uncomplicated diverticulitis, and ultimately he would like to proceed with antibiotic therapy.  Will discharge with oral antibiotics, Zofran, and oxycodone to help with symptoms.  Advised to return to the emergency room if worsening symptoms develop, otherwise should follow-up with his primary provider in clinic.     I have reviewed the nursing notes.    I have reviewed the findings, diagnosis, plan and need for follow up with the patient.           Medical Decision Making  The patient's presentation was of low complexity (an acute and uncomplicated illness or injury).    The patient's evaluation involved:  ordering and/or review of 3+ test(s) in this encounter (see separate area of note for details)    The patient's management necessitated moderate risk (prescription drug management including medications given in the ED).        New Prescriptions    CIPROFLOXACIN (CIPRO) 500 MG TABLET    Take 1 tablet (500 mg) by mouth 2 times daily    METRONIDAZOLE (FLAGYL) 500 MG TABLET    Take 1 tablet (500 mg) by mouth 3 times daily for 7 days    ONDANSETRON (ZOFRAN ODT) 4 MG ODT TAB    Take 1 tablet (4 mg) by mouth every 6 hours as needed for nausea or vomiting    OXYCODONE (ROXICODONE) 5 MG TABLET    Take 1 tablet (5 mg) by mouth every 6 hours as needed for pain       Final diagnoses:   Sigmoid diverticulitis       5/8/2023   [facility] EMERGENCY DEPT     Emma Hernandez DO  05/08/23 [id number]

## 2023-05-08 NOTE — LETTER
May 9, 2023      To Whom It May Concern:      Reji Choi was seen in our Emergency Department today, 05/09/23.  I expect his condition to improve over the next 1-2 days.  He may return to work when improved.        Sincerely,        Raul Hooks [facility] ED Staff Physician

## 2023-05-08 NOTE — DISCHARGE INSTRUCTIONS
Your CT showed signs of mild acute diverticulitis, no sign of abscess or perforation    May start taking the antibiotics this evening.  Complete the course even if you begin to feel better    You may take the Zofran every 6 hours as needed to help with nausea or vomiting    May take the oxycodone every 6-8 hours as needed for acute severe pain.  Try to use this sparingly, and use with caution as it can cause drowsiness so do not drive or drink alcohol if taking this medicine    Follow-up with your primary provider as needed.  If any new or worsening symptoms develop, especially if you have worsening pain despite provided medication, develop a fever, or have any new concerns, do not hesitate to return promptly to the ER

## 2023-05-08 NOTE — ED NOTES
PO water completed. Meds administered. IV fluids infusing. Call light in reach. Pt will go to CT.

## 2023-05-08 NOTE — MEDICATION SCRIBE - ADMISSION MEDICATION HISTORY
Medication Scribe Admission Medication History    Admission medication history is complete. The information provided in this note is only as accurate as the sources available at the time of the update.    Medication reconciliation/reorder completed by provider prior to medication history? No    Information Source(s): Patient via in-person    Pertinent Information: n/a    Changes made to PTA medication list:    Added: tylenol and Ibu    Deleted: naltrexone compound, melatonin, flagyl, oxycodone    Changed: None    Medication Affordability:  Not including over the counter (OTC) medications, was there a time in the past 12 months when you did not take your medications as prescribed because of cost?: No    Allergies reviewed with patient and updates made in EHR: yes    Medication History Completed By: ARLETTE BOYD 5/8/2023 5:53 PM    Prior to Admission medications    Medication Sig Last Dose Taking? Auth Provider Long Term End Date   acetaminophen (TYLENOL) 500 MG tablet Take 1,000 mg by mouth every 6 hours as needed for mild pain 5/8/2023 at 0600 Yes Reported, Patient     ciprofloxacin (CIPRO) 500 MG tablet Take 1 tablet (500 mg) by mouth 2 times daily  Yes Emma Hernandez,      colestipol (COLESTID) 1 g tablet Take 2 g by mouth 2 times daily  5/8/2023 at am Yes Reported, Patient Yes    desonide (DESOWEN) 0.05 % external cream Apply topically 2 times daily as needed (irritation) Past Month at unk Yes Reported, Patient     escitalopram (LEXAPRO) 20 MG tablet Take 20 mg by mouth daily 5/8/2023 at am Yes Reported, Patient Yes    ibuprofen (ADVIL/MOTRIN) 200 MG tablet Take 800 mg by mouth every 8 hours as needed for pain 5/8/2023 at 0600 Yes Reported, Patient     losartan (COZAAR) 100 MG tablet Take 100 mg by mouth daily  5/8/2023 at am Yes Reported, Patient Yes    metoprolol succinate ER (TOPROL-XL) 200 MG 24 hr tablet Take 200 mg by mouth daily 5/8/2023 at am Yes Reported, Patient Yes    metroNIDAZOLE  (FLAGYL) 500 MG tablet Take 1 tablet (500 mg) by mouth 3 times daily for 7 days  Yes Emma Hernandez DO  5/15/23   omeprazole (PRILOSEC) 20 MG DR capsule Take 20 mg by mouth daily  5/8/2023 at am Yes Reported, Patient     ondansetron (ZOFRAN ODT) 4 MG ODT tab Take 1 tablet (4 mg) by mouth every 6 hours as needed for nausea or vomiting  Yes Emma Hernandez DO  5/13/23   oxyCODONE (ROXICODONE) 5 MG tablet Take 1 tablet (5 mg) by mouth every 6 hours as needed for pain  Yes Emma Hernandez DO  5/11/23

## 2023-09-14 ENCOUNTER — APPOINTMENT (OUTPATIENT)
Dept: CT IMAGING | Facility: CLINIC | Age: 43
End: 2023-09-14
Attending: STUDENT IN AN ORGANIZED HEALTH CARE EDUCATION/TRAINING PROGRAM
Payer: COMMERCIAL

## 2023-09-14 ENCOUNTER — HOSPITAL ENCOUNTER (EMERGENCY)
Facility: CLINIC | Age: 43
Discharge: HOME | End: 2023-09-14
Attending: STUDENT IN AN ORGANIZED HEALTH CARE EDUCATION/TRAINING PROGRAM | Admitting: STUDENT IN AN ORGANIZED HEALTH CARE EDUCATION/TRAINING PROGRAM
Payer: COMMERCIAL

## 2023-09-14 ENCOUNTER — APPOINTMENT (OUTPATIENT)
Dept: MRI IMAGING | Facility: CLINIC | Age: 43
End: 2023-09-14
Attending: STUDENT IN AN ORGANIZED HEALTH CARE EDUCATION/TRAINING PROGRAM
Payer: COMMERCIAL

## 2023-09-14 VITALS
TEMPERATURE: 97.9 F | RESPIRATION RATE: 12 BRPM | SYSTOLIC BLOOD PRESSURE: 153 MMHG | DIASTOLIC BLOOD PRESSURE: 89 MMHG | HEART RATE: 70 BPM | BODY MASS INDEX: 23.73 KG/M2 | OXYGEN SATURATION: 98 % | WEIGHT: 175 LBS

## 2023-09-14 DIAGNOSIS — R31.9 HEMATURIA, UNSPECIFIED TYPE: ICD-10-CM

## 2023-09-14 DIAGNOSIS — M54.41 ACUTE RIGHT-SIDED LOW BACK PAIN WITH RIGHT-SIDED SCIATICA: ICD-10-CM

## 2023-09-14 DIAGNOSIS — M51.369 BULGING LUMBAR DISC: ICD-10-CM

## 2023-09-14 LAB
ALBUMIN SERPL BCG-MCNC: 5.1 G/DL (ref 3.5–5.2)
ALBUMIN UR-MCNC: NEGATIVE MG/DL
ALP SERPL-CCNC: 49 U/L (ref 40–129)
ALT SERPL W P-5'-P-CCNC: 28 U/L (ref 0–70)
ANION GAP SERPL CALCULATED.3IONS-SCNC: 15 MMOL/L (ref 7–15)
APPEARANCE UR: CLEAR
AST SERPL W P-5'-P-CCNC: 19 U/L (ref 0–45)
BASOPHILS # BLD AUTO: 0 10E3/UL (ref 0–0.2)
BASOPHILS NFR BLD AUTO: 0 %
BILIRUB SERPL-MCNC: 0.4 MG/DL
BILIRUB UR QL STRIP: NEGATIVE
BUN SERPL-MCNC: 18.6 MG/DL (ref 6–20)
CALCIUM SERPL-MCNC: 9.7 MG/DL (ref 8.6–10)
CHLORIDE SERPL-SCNC: 99 MMOL/L (ref 98–107)
COLOR UR AUTO: YELLOW
CREAT SERPL-MCNC: 0.66 MG/DL (ref 0.67–1.17)
CRP SERPL-MCNC: 3.28 MG/L
DEPRECATED HCO3 PLAS-SCNC: 24 MMOL/L (ref 22–29)
EGFRCR SERPLBLD CKD-EPI 2021: >90 ML/MIN/1.73M2
EOSINOPHIL # BLD AUTO: 0.1 10E3/UL (ref 0–0.7)
EOSINOPHIL NFR BLD AUTO: 0 %
ERYTHROCYTE [DISTWIDTH] IN BLOOD BY AUTOMATED COUNT: 12.9 % (ref 10–15)
ERYTHROCYTE [SEDIMENTATION RATE] IN BLOOD BY WESTERGREN METHOD: 10 MM/HR (ref 0–15)
GLUCOSE SERPL-MCNC: 123 MG/DL (ref 70–99)
GLUCOSE UR STRIP-MCNC: NEGATIVE MG/DL
HCT VFR BLD AUTO: 40.3 % (ref 40–53)
HGB BLD-MCNC: 14 G/DL (ref 13.3–17.7)
HGB UR QL STRIP: ABNORMAL
IMM GRANULOCYTES # BLD: 0.1 10E3/UL
IMM GRANULOCYTES NFR BLD: 1 %
KETONES UR STRIP-MCNC: NEGATIVE MG/DL
LEUKOCYTE ESTERASE UR QL STRIP: NEGATIVE
LYMPHOCYTES # BLD AUTO: 2.4 10E3/UL (ref 0.8–5.3)
LYMPHOCYTES NFR BLD AUTO: 20 %
MCH RBC QN AUTO: 31 PG (ref 26.5–33)
MCHC RBC AUTO-ENTMCNC: 34.7 G/DL (ref 31.5–36.5)
MCV RBC AUTO: 89 FL (ref 78–100)
MONOCYTES # BLD AUTO: 0.7 10E3/UL (ref 0–1.3)
MONOCYTES NFR BLD AUTO: 6 %
MUCOUS THREADS #/AREA URNS LPF: PRESENT /LPF
NEUTROPHILS # BLD AUTO: 8.9 10E3/UL (ref 1.6–8.3)
NEUTROPHILS NFR BLD AUTO: 73 %
NITRATE UR QL: NEGATIVE
NRBC # BLD AUTO: 0 10E3/UL
NRBC BLD AUTO-RTO: 0 /100
PH UR STRIP: 5 [PH] (ref 5–7)
PLATELET # BLD AUTO: 441 10E3/UL (ref 150–450)
POTASSIUM SERPL-SCNC: 4.3 MMOL/L (ref 3.4–5.3)
PROT SERPL-MCNC: 7.6 G/DL (ref 6.4–8.3)
RBC # BLD AUTO: 4.52 10E6/UL (ref 4.4–5.9)
RBC URINE: 3 /HPF
SODIUM SERPL-SCNC: 138 MMOL/L (ref 136–145)
SP GR UR STRIP: 1.03 (ref 1–1.03)
SQUAMOUS EPITHELIAL: <1 /HPF
UROBILINOGEN UR STRIP-MCNC: NORMAL MG/DL
WBC # BLD AUTO: 12.1 10E3/UL (ref 4–11)
WBC URINE: 0 /HPF

## 2023-09-14 PROCEDURE — 36415 COLL VENOUS BLD VENIPUNCTURE: CPT | Performed by: STUDENT IN AN ORGANIZED HEALTH CARE EDUCATION/TRAINING PROGRAM

## 2023-09-14 PROCEDURE — 96374 THER/PROPH/DIAG INJ IV PUSH: CPT | Performed by: STUDENT IN AN ORGANIZED HEALTH CARE EDUCATION/TRAINING PROGRAM

## 2023-09-14 PROCEDURE — 80053 COMPREHEN METABOLIC PANEL: CPT | Performed by: STUDENT IN AN ORGANIZED HEALTH CARE EDUCATION/TRAINING PROGRAM

## 2023-09-14 PROCEDURE — 74176 CT ABD & PELVIS W/O CONTRAST: CPT

## 2023-09-14 PROCEDURE — 99285 EMERGENCY DEPT VISIT HI MDM: CPT | Mod: 25 | Performed by: STUDENT IN AN ORGANIZED HEALTH CARE EDUCATION/TRAINING PROGRAM

## 2023-09-14 PROCEDURE — 86140 C-REACTIVE PROTEIN: CPT | Performed by: STUDENT IN AN ORGANIZED HEALTH CARE EDUCATION/TRAINING PROGRAM

## 2023-09-14 PROCEDURE — 72148 MRI LUMBAR SPINE W/O DYE: CPT

## 2023-09-14 PROCEDURE — 81001 URINALYSIS AUTO W/SCOPE: CPT | Performed by: STUDENT IN AN ORGANIZED HEALTH CARE EDUCATION/TRAINING PROGRAM

## 2023-09-14 PROCEDURE — 96376 TX/PRO/DX INJ SAME DRUG ADON: CPT | Performed by: STUDENT IN AN ORGANIZED HEALTH CARE EDUCATION/TRAINING PROGRAM

## 2023-09-14 PROCEDURE — 85652 RBC SED RATE AUTOMATED: CPT | Performed by: STUDENT IN AN ORGANIZED HEALTH CARE EDUCATION/TRAINING PROGRAM

## 2023-09-14 PROCEDURE — 51798 US URINE CAPACITY MEASURE: CPT | Performed by: STUDENT IN AN ORGANIZED HEALTH CARE EDUCATION/TRAINING PROGRAM

## 2023-09-14 PROCEDURE — 99284 EMERGENCY DEPT VISIT MOD MDM: CPT | Performed by: STUDENT IN AN ORGANIZED HEALTH CARE EDUCATION/TRAINING PROGRAM

## 2023-09-14 PROCEDURE — 250N000011 HC RX IP 250 OP 636: Performed by: STUDENT IN AN ORGANIZED HEALTH CARE EDUCATION/TRAINING PROGRAM

## 2023-09-14 PROCEDURE — 85025 COMPLETE CBC W/AUTO DIFF WBC: CPT | Performed by: STUDENT IN AN ORGANIZED HEALTH CARE EDUCATION/TRAINING PROGRAM

## 2023-09-14 RX ORDER — METHYLPREDNISOLONE 4 MG
TABLET, DOSE PACK ORAL
COMMUNITY
Start: 2023-09-11 | End: 2023-09-21

## 2023-09-14 RX ORDER — METHOCARBAMOL 500 MG/1
1 TABLET, FILM COATED ORAL 3 TIMES DAILY PRN
COMMUNITY
Start: 2023-09-11 | End: 2023-09-21

## 2023-09-14 RX ORDER — OXYCODONE AND ACETAMINOPHEN 5; 325 MG/1; MG/1
1 TABLET ORAL EVERY 6 HOURS PRN
Qty: 12 TABLET | Refills: 0 | Status: SHIPPED | OUTPATIENT
Start: 2023-09-14 | End: 2023-09-17

## 2023-09-14 RX ORDER — PREDNISONE 20 MG/1
TABLET ORAL
Qty: 10 TABLET | Refills: 0 | Status: SHIPPED | OUTPATIENT
Start: 2023-09-14 | End: 2023-09-21

## 2023-09-14 RX ORDER — OXCARBAZEPINE 150 MG/1
150 TABLET, FILM COATED ORAL 2 TIMES DAILY
COMMUNITY
Start: 2023-09-12

## 2023-09-14 RX ORDER — RISPERIDONE 0.5 MG/1
0.25 TABLET ORAL 2 TIMES DAILY PRN
COMMUNITY
Start: 2023-08-31

## 2023-09-14 RX ORDER — HYDROMORPHONE HYDROCHLORIDE 1 MG/ML
0.5 INJECTION, SOLUTION INTRAMUSCULAR; INTRAVENOUS; SUBCUTANEOUS ONCE
Status: COMPLETED | OUTPATIENT
Start: 2023-09-14 | End: 2023-09-14

## 2023-09-14 RX ORDER — EZETIMIBE 10 MG/1
10 TABLET ORAL DAILY
COMMUNITY

## 2023-09-14 RX ORDER — GABAPENTIN 100 MG/1
100-200 CAPSULE ORAL 3 TIMES DAILY
COMMUNITY

## 2023-09-14 RX ADMIN — HYDROMORPHONE HYDROCHLORIDE 0.5 MG: 1 INJECTION, SOLUTION INTRAMUSCULAR; INTRAVENOUS; SUBCUTANEOUS at 09:25

## 2023-09-14 RX ADMIN — HYDROMORPHONE HYDROCHLORIDE 0.5 MG: 1 INJECTION, SOLUTION INTRAMUSCULAR; INTRAVENOUS; SUBCUTANEOUS at 07:45

## 2023-09-14 RX ADMIN — HYDROMORPHONE HYDROCHLORIDE 0.5 MG: 1 INJECTION, SOLUTION INTRAMUSCULAR; INTRAVENOUS; SUBCUTANEOUS at 10:43

## 2023-09-14 ASSESSMENT — ACTIVITIES OF DAILY LIVING (ADL)
ADLS_ACUITY_SCORE: 35

## 2023-09-14 NOTE — ED PROVIDER NOTES
History     Chief Complaint   Patient presents with    Back Pain     HPI  Reji Choi is a 43 year old male with history of hypertension, chronic neck pain with controlled substance agreement presents for evaluation of back pain.  Patient describes waxing and waning right lower back pain for the past 9 to 10 weeks which originally started after his dog pulled too hard on the leash causing him to fall onto the ground.  This pain was mostly tolerable and had been improving until he again strained the lower back while working on his house.  For the past 4 days he has had excruciating right low back pain with radiation down his right leg.  He was seen for these complaints at [facility] and was prescribed steroids and a muscle relaxer.  Patient was told that he needed an MRI but they have been unable to schedule this to date.  Patient further describes having some dysuria and urinary urgency over the past 2 days.  Is associated with some minimal suprapubic discomfort.  Patient otherwise feels well, denies associated fever, chills, nausea or vomiting, saddle anesthesia or incontinence, genuine focal weakness or numbness/tingling.  He denies prior lumbar surgeries but did have an injection many years ago due to known slipped disks/inflammation.  No other complaints today.    Allergies:  Allergies   Allergen Reactions    Ampicillin-Sulbactam Sodium Anaphylaxis    Clindamycin Other (See Comments)     Canker sores in mouth    Ketorolac Other (See Comments)     Severe agitation    Pravastatin Muscle Pain (Myalgia)     Liver stress    Rosuvastatin Muscle Pain (Myalgia)     Liver stress    Simvastatin Muscle Pain (Myalgia)     Liver stress       Problem List:    Patient Active Problem List    Diagnosis Date Noted    Controlled substance agreement signed 02/01/2018     Priority: Medium     for tramadol      Chronic neck pain 10/03/2017     Priority: Medium    Biliary dyskinesia 08/29/2017     Priority:  Medium    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    Past Surgical History:    Past Surgical History:   Procedure Laterality Date    CHOLECYSTECTOMY         Family History:    No family history on file.    Social History:  Marital Status:   [2]  Social History     Tobacco Use    Smoking status: Former    Smokeless tobacco: Never    Tobacco comments:     eight months ago   Substance Use Topics    Alcohol use: Yes     Comment: occ    Drug use: Yes     Types: Marijuana     Comment: prescription         Medications:    acetaminophen (TYLENOL) 500 MG tablet  colestipol (COLESTID) 1 g tablet  desonide (DESOWEN) 0.05 % external cream  escitalopram (LEXAPRO) 20 MG tablet  gabapentin (NEURONTIN) 100 MG capsule  losartan (COZAAR) 100 MG tablet  methocarbamol (ROBAXIN) 500 MG tablet  methylPREDNISolone (MEDROL DOSEPAK) 4 MG tablet therapy pack  metoprolol succinate ER (TOPROL-XL) 200 MG 24 hr tablet  omeprazole (PRILOSEC) 20 MG DR capsule  OXcarbazepine (TRILEPTAL) 150 MG tablet  oxyCODONE-acetaminophen (PERCOCET) 5-325 MG tablet  predniSONE (DELTASONE) 20 MG tablet  risperiDONE (RISPERDAL) 0.5 MG tablet  ezetimibe (ZETIA) 10 MG tablet  ibuprofen (ADVIL/MOTRIN) 200 MG tablet      Review of Systems   All other systems reviewed and are negative.  See HPI.    Physical Exam   BP: (!) 150/109  Pulse: 84  Temp: 97.9  F (36.6  C)  Resp: 18  Weight: 79.4 kg (175 lb)  SpO2: 99 %      Physical Exam  Vitals and nursing note reviewed.    Constitutional:       Appearance: Normal appearance. He is not toxic-appearing.      Comments: Patient describes excruciating right lower back pain and is standing during initial examination secondary to pain.  He is pacing around the room at times and states the only spot where he can find some comfort is sitting upright in a reclining chair.  He is very anxious and has some pressured speech.   HENT:      Head: Normocephalic and atraumatic.      Mouth/Throat:      Mouth: Mucous membranes are moist.      Pharynx: No oropharyngeal exudate or posterior oropharyngeal erythema.   Eyes:      General: No scleral icterus.     Extraocular Movements: Extraocular movements intact.      Conjunctiva/sclera: Conjunctivae normal.      Pupils: Pupils are equal, round, and reactive to light.   Cardiovascular:      Rate and Rhythm: Normal rate and regular rhythm.      Pulses: Normal pulses.      Heart sounds: Normal heart sounds.   Pulmonary:      Effort: Pulmonary effort is normal. No respiratory distress.      Breath sounds: Normal breath sounds.   Abdominal:      Palpations: Abdomen is soft.      Tenderness: There is no abdominal tenderness.   Musculoskeletal:         General: Tenderness present. No swelling or deformity. Normal range of motion.      Cervical back: Normal range of motion and neck supple. No rigidity or tenderness.      Comments: Mild tenderness to palpation over the right SI/paraspinal musculature.  No direct midline tenderness or overlying skin changes/bony step-off.   Skin:     General: Skin is warm.      Capillary Refill: Capillary refill takes less than 2 seconds.   Neurological:      General: No focal deficit present.      Mental Status: He is alert and oriented to person, place, and time.      Motor: No weakness.      Gait: Gait normal.      Comments: Gait is somewhat antalgic but otherwise unremarkable.  He has full strength and sensation to all extremities including both legs.  Positive SLR on the  right.   Psychiatric:      Comments: Very anxious, uncomfortable.         ED Course          ED Course as of 09/14/23 1538   Thu Sep 14, 2023   0800 Patient resting comfortably after medications.  Unfortunately the MRI schedule is backed up today.  He is scheduled for mid afternoon and is agreeable to waiting until then.  Urinalysis shows a small amount of blood of uncertain etiology.  Will discuss obtaining CT scan to rule out kidney stone.     Procedures              Results for orders placed or performed during the hospital encounter of 09/14/23 (from the past 24 hour(s))   UA with Microscopic reflex to Culture    Specimen: Urine, NOS   Result Value Ref Range    Color Urine Yellow Colorless, Straw, Light Yellow, Yellow    Appearance Urine Clear Clear    Glucose Urine Negative Negative mg/dL    Bilirubin Urine Negative Negative    Ketones Urine Negative Negative mg/dL    Specific Gravity Urine 1.027 1.003 - 1.035    Blood Urine Small (A) Negative    pH Urine 5.0 5.0 - 7.0    Protein Albumin Urine Negative Negative mg/dL    Urobilinogen Urine Normal Normal, 2.0 mg/dL    Nitrite Urine Negative Negative    Leukocyte Esterase Urine Negative Negative    Mucus Urine Present (A) None Seen /LPF    RBC Urine 3 (H) <=2 /HPF    WBC Urine 0 <=5 /HPF    Squamous Epithelials Urine <1 <=1 /HPF    Narrative    Urine Culture not indicated   CBC with platelets differential    Narrative    The following orders were created for panel order CBC with platelets differential.  Procedure                               Abnormality         Status                     ---------                               -----------         ------                     CBC with platelets and d...[[id number]]  Abnormal            Final result                 Please view results for these tests on the individual orders.   Comprehensive metabolic panel   Result Value Ref Range    Sodium 138 136 - 145 mmol/L    Potassium 4.3 3.4 - 5.3 mmol/L    Chloride 99 98 -  107 mmol/L    Carbon Dioxide (CO2) 24 22 - 29 mmol/L    Anion Gap 15 7 - 15 mmol/L    Urea Nitrogen 18.6 6.0 - 20.0 mg/dL    Creatinine 0.66 (L) 0.67 - 1.17 mg/dL    Calcium 9.7 8.6 - 10.0 mg/dL    Glucose 123 (H) 70 - 99 mg/dL    Alkaline Phosphatase 49 40 - 129 U/L    AST 19 0 - 45 U/L    ALT 28 0 - 70 U/L    Protein Total 7.6 6.4 - 8.3 g/dL    Albumin 5.1 3.5 - 5.2 g/dL    Bilirubin Total 0.4 <=1.2 mg/dL    GFR Estimate >90 >60 mL/min/1.73m2   Erythrocyte sedimentation rate auto   Result Value Ref Range    Erythrocyte Sedimentation Rate 10 0 - 15 mm/hr   CRP inflammation   Result Value Ref Range    CRP Inflammation 3.28 <5.00 mg/L   CBC with platelets and differential   Result Value Ref Range    WBC Count 12.1 (H) 4.0 - 11.0 10e3/uL    RBC Count 4.52 4.40 - 5.90 10e6/uL    Hemoglobin 14.0 13.3 - 17.7 g/dL    Hematocrit 40.3 40.0 - 53.0 %    MCV 89 78 - 100 fL    MCH 31.0 26.5 - 33.0 pg    MCHC 34.7 31.5 - 36.5 g/dL    RDW 12.9 10.0 - 15.0 %    Platelet Count 441 150 - 450 10e3/uL    % Neutrophils 73 %    % Lymphocytes 20 %    % Monocytes 6 %    % Eosinophils 0 %    % Basophils 0 %    % Immature Granulocytes 1 %    NRBCs per 100 WBC 0 <1 /100    Absolute Neutrophils 8.9 (H) 1.6 - 8.3 10e3/uL    Absolute Lymphocytes 2.4 0.8 - 5.3 10e3/uL    Absolute Monocytes 0.7 0.0 - 1.3 10e3/uL    Absolute Eosinophils 0.1 0.0 - 0.7 10e3/uL    Absolute Basophils 0.0 0.0 - 0.2 10e3/uL    Absolute Immature Granulocytes 0.1 <=0.4 10e3/uL    Absolute NRBCs 0.0 10e3/uL   CT Abdomen Pelvis w/o Contrast    Narrative    CT ABDOMEN PELVIS W/O CONTRAST 9/14/2023 9:11 AM    CLINICAL HISTORY: Right lower back pain with hematuria, rule out  kidney stone  TECHNIQUE: CT scan of the abdomen and pelvis was performed without IV  contrast. Multiplanar reformats were obtained. Dose reduction  techniques were used.  CONTRAST: None.    COMPARISON: 5/8/2023    FINDINGS:   LOWER CHEST: Normal.    HEPATOBILIARY: Normal noncontrast CT appearance of the  liver.  Cholecystectomy.    PANCREAS: Normal.    SPLEEN: Normal.    ADRENAL GLANDS: Normal.    KIDNEYS/BLADDER: Tiny right renal calculi measuring up to 2 mm. A few  tiny left renal calculi measuring up to 4 mm. No hydronephrosis in  either kidney. No perinephric stranding. No calculi in either ureter  or in the urinary bladder.    BOWEL: Normal with no obstruction or acute inflammatory change.  Nothing for appendicitis.    LYMPH NODES: No lymphadenopathy.    VASCULATURE: No abdominal aortic aneurysm.    PELVIC ORGANS: No pelvic masses.    OTHER: No free fluid or fluid collections. No free air.    MUSCULOSKELETAL: No suspicious lesions in the bones.      Impression    IMPRESSION:   1.  Few tiny nonobstructing bilateral renal calculi measuring 2 to 4  mm. No hydronephrosis or perinephric stranding bilaterally.    JANINA SOLIZ MD         SYSTEM ID:  [id number]   Lumbar spine MRI w/o contrast    Narrative    MRI LUMBAR SPINE WITHOUT CONTRAST   9/14/2023 11:26 AM     HISTORY: Severe right lower back pain with radiation down his right  leg, describes urinary retention, waxing and waning symptoms for nine  weeks, exacerbated three days ago.    TECHNIQUE: Multiplanar multisequence MRI of the lumbar spine without  contrast.    COMPARISON: None.     FINDINGS:  For numbering purposes, the L1 vertebra is considered to have normal  variant tiny ribs with 12 rib-bearing vertebra superior to what is  considered to be L1 confirmed on the prior chest x-ray from 2/10/2023.  Alignment is significant for multilevel subtle grade 1  spondylolisthesis. Bone marrow demonstrates mild degenerative endplate  changes including marrow edema (Modic 1) most conspicuous surrounding  L5-S1 disc joint. 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 Conus medullaris  is unremarkable terminating at the level of L1-L2 disc. Cauda equina  is unremarkable. Tiny areas of T2 hyperintense signal within  the left  renal pelvis, probably benign.    Segmental Analysis:     T12-L1:  Disc height maintained. No herniation. Normal facet joints.  No foraminal or spinal canal stenosis.     L1-L2:  Disc height maintained. No herniation. Normal facet joints. No  foraminal or spinal canal stenosis.      L2-L3:  Mild disc height loss. Minimal bulge with annular fissuring.  Mild bilateral facet arthropathy. No foraminal or spinal canal  stenosis.      L3-L4:  Mild disc height loss. Minimal bulge with annular fissuring.  Mild right and moderate left facet arthropathy. No foraminal or spinal  canal stenosis.      L4-L5:  Mild disc height loss. Broad central protrusion in close  proximity to the bowel traversing L5 nerve roots. Mild bilateral facet  arthropathy. Mild bilateral foraminal stenosis. Mild spinal canal  stenosis.      L5-S1:  Mild to moderate disc height loss. Central disc extrusion,  with bilateral paracentral components abutting the bilateral  traversing S1 nerve roots within the lateral recesses with posterior  displacement of the traversing right S1 nerve root (series 6 image 47,  series 8 image 14). Mild bilateral facet arthropathy. Moderate  bilateral foraminal stenosis. Moderate spinal canal stenosis related  to the herniation.      Impression    IMPRESSION:    Degenerative change as detailed, worst at L5-S1 and L4-L5.    LASHAUN DOVER MD         SYSTEM ID:  [id number]       Medications   HYDROmorphone (PF) (DILAUDID) injection 0.5 mg (0.5 mg Intravenous $Given 9/14/23 9610)   HYDROmorphone (PF) (DILAUDID) injection 0.5 mg (0.5 mg Intravenous $Given 9/14/23 0954)   HYDROmorphone (PF) (DILAUDID) injection 0.5 mg (0.5 mg Intravenous $Given 9/14/23 1043)       Assessments & Plan (with Medical Decision Making)     I have reviewed the nursing notes.    I have reviewed the findings, diagnosis, plan and need for follow up with the patient.    Medical Decision Making  Reji Choi is a 43 year old male with history of  hypertension, chronic neck pain with controlled substance agreement presents for evaluation of back pain.  Presents on arrival, vitals otherwise normal.  He appears very uncomfortable during initial meeting, pacing around the room and unable to sit for any extended period of time secondary to sciatica.  Exam is significant for mild tenderness to the right lower back/SI region with no overlying skin changes.  SLR is positive and causes severe exacerbation of his pain.  Separately the patient describes urinary urgency and possible retention.  Denies other red flag symptoms such as saddle anesthesia or incontinence to date.  He has had injections to the lumbar spine years ago but denies prior surgeries and also denies risk factors such as IV drug use.  I suspect he is having a bulging/slipped disc causing sciatica.  However, he has already been seen by an orthopedic provider and an MRI was recommended, scheduling still pending.  He did not have significant urinary retention on bladder scan, but I do think MRI is most valuable for his assessment today given symptoms.  He experienced dramatic relief of pain with Dilaudid.  Urinalysis showed a small amount of blood so a CT scan of his abdomen and pelvis was added to rule out a kidney stone.  This showed a few nonobstructing stones in the kidneys but no other abnormalities.  There was some delay in obtaining MRI due to the schedule being very full today.  When this was completed it was significant for degenerative changes and with areas of disc protrusion near L5-S1.  I believe he is appropriate for discharge at this time but he will benefit from neurosurgical evaluation and possible injections.  Prescription for prednisone was provided today along with a short prescription for pain medication to help with breakthrough symptoms.  Also recommended that he follow-up with his primary care doctor for repeat urinalysis and evaluation of hematuria.  Advised the patient to  follow-up as soon as possible and return to the emergency department immediately for any new or acutely worsened symptoms.      Discharge Medication List as of 9/14/2023 12:44 PM        START taking these medications    Details   oxyCODONE-acetaminophen (PERCOCET) 5-325 MG tablet Take 1 tablet by mouth every 6 hours as needed for pain, Disp-12 tablet, R-0, Local Print             Final diagnoses:   Acute right-sided low back pain with right-sided sciatica   Bulging lumbar disc   Hematuria, unspecified type       9/14/2023   [facility] EMERGENCY DEPT       Red Leal MD  09/14/23 9280

## 2023-09-14 NOTE — ED TRIAGE NOTES
Pt reports an incident resulting in back pain x10 weeks ago. Over the last weekend pt had a secondary incident irritating back and increasing pain. Pt was seen at [facility] urgent [facility] and given prednisone and muscle relaxer. Pt was told he would need an MRI and has not been able to schedule one. Pain constantly 9/10.      Triage Assessment       Row Name 09/14/23 0634       Triage Assessment (Adult)    Airway WDL WDL       Respiratory WDL    Respiratory WDL WDL       Skin Circulation/Temperature WDL    Skin Circulation/Temperature WDL WDL       Cardiac WDL    Cardiac WDL WDL       Peripheral/Neurovascular WDL    Peripheral Neurovascular WDL WDL       Cognitive/Neuro/Behavioral WDL    Cognitive/Neuro/Behavioral WDL WDL

## 2023-09-14 NOTE — DISCHARGE INSTRUCTIONS
Overall your work-up today was reassuring.  You do have significant disc protrusion and sciatica, but no other acute abnormalities were identified.  I think it is very important that you finish your course of steroids.  I will also prescribe a short duration of stronger pain medication to help with breakthrough symptoms.  Continue to use Tylenol/Motrin.  Referral to neuro/spine surgery was also provided, make an appointment as soon as possible.  You were also found to have some blood in your urine today, the exact cause of this is still somewhat unclear.  Follow-up with your primary care doctor for review of symptoms and repeat urine testing as soon as possible.  Return to the emergency department in the meantime for new or acutely worsening symptoms.

## 2023-09-14 NOTE — MEDICATION SCRIBE - ADMISSION MEDICATION HISTORY
Medication Scribe Admission Medication History    Admission medication history is complete. The information provided in this note is only as accurate as the sources available at the time of the update.    Medication reconciliation/reorder completed by provider prior to medication history? No    Information Source(s): Patient and CareEverywhere/SureScripts via in-person    Pertinent Information: discharged    Changes made to PTA medication list:  Added: zetia, gabapentin, methocarbamol, oxcarbazapine, medrol dose reji, risperidone  Deleted: Cipro  Changed: tylenol to Q4H when unable to alternate with ibuprofen    Medication Affordability:  Not including over the counter (OTC) medications, was there a time in the past 3 months when you did not take your medications as prescribed because of cost?: No    Allergies reviewed with patient and updates made in EHR: yes    Medication History Completed By: ARLETTE BOYD 9/14/2023 12:51 PM    Prior to Admission medications    Medication Sig Last Dose Taking? Auth Provider Long Term End Date   acetaminophen (TYLENOL) 500 MG tablet Take 1,000 mg by mouth every 4 hours as needed for mild pain 9/14/2023 at 0530 Yes Reported, Patient     colestipol (COLESTID) 1 g tablet Take 2 g by mouth 2 times daily  9/13/2023 at am Yes Reported, Patient Yes    desonide (DESOWEN) 0.05 % external cream Apply topically 2 times daily as needed (irritation) Past Month at unkn Yes Reported, Patient     escitalopram (LEXAPRO) 20 MG tablet Take 20 mg by mouth daily 9/14/2023 at am Yes Reported, Patient Yes    gabapentin (NEURONTIN) 100 MG capsule Take 100-200 mg by mouth 3 times daily 9/14/2023 at am 1st Yes Reported, Patient Yes    losartan (COZAAR) 100 MG tablet Take 100 mg by mouth daily  9/14/2023 at am Yes Reported, Patient Yes    methocarbamol (ROBAXIN) 500 MG tablet Take 1 tablet by mouth 3 times daily as needed for muscle spasms 9/14/2023 at 0530 Yes Reported, Patient     methylPREDNISolone  (MEDROL DOSEPAK) 4 MG tablet therapy pack USE AS DIRECTED, DO NOT TAKE ANY OTHER NSAID S WHILE USING THIS MEDICATON 9/14/2023 at am DAY #4 Yes Reported, Patient     metoprolol succinate ER (TOPROL-XL) 200 MG 24 hr tablet Take 200 mg by mouth daily 9/14/2023 at am Yes Reported, Patient Yes    omeprazole (PRILOSEC) 20 MG DR capsule Take 20 mg by mouth daily  9/14/2023 at am Yes Reported, Patient     OXcarbazepine (TRILEPTAL) 150 MG tablet Take 150 mg by mouth 2 times daily 9/14/2023 at am Yes Reported, Patient Yes    oxyCODONE-acetaminophen (PERCOCET) 5-325 MG tablet Take 1 tablet by mouth every 6 hours as needed for pain  Yes Red Leal MD  9/17/23   risperiDONE (RISPERDAL) 0.5 MG tablet Take 0.25 mg by mouth 2 times daily as needed (anxiety) Past Month at unkn Yes Reported, Patient Yes    ezetimibe (ZETIA) 10 MG tablet Take 10 mg by mouth daily  at not started  Reported, Patient Yes    ibuprofen (ADVIL/MOTRIN) 200 MG tablet Take 800 mg by mouth every 8 hours as needed for pain  at HOLD  Reported, Patient

## 2023-09-14 NOTE — Clinical Note
Reji Choi was seen and treated in our emergency department on 9/14/2023.  He may return to work on 09/16/2023.       If you have any questions or concerns, please don't hesitate to call.      Red Leal MD

## 2023-09-18 NOTE — TELEPHONE ENCOUNTER
RECORDS RECEIVED FROM: Referred by Red Leal MD   REASON FOR VISIT: Acute right-sided low back pain with right-sided sciatica   Bulging lumbar disc      Date of Appt: 10/03/23   NOTES (FOR ALL VISITS) STATUS DETAILS   OFFICE NOTE from referring provider Internal Referral in chart   OFFICE NOTE from other specialist N/A    DISCHARGE SUMMARY from hospital N/A    DISCHARGE REPORT from the ER N/A    OPERATIVE REPORT N/A    [facility] (MS ONLY) N/A    EMG N/A    EEG N/A    MEDICATION LIST N/A    IMAGING  (FOR ALL VISITS)     LUMBAR PUNCTURE N/A    MARK SCAN (MOVEMENT) N/A    ULTRASOUND (CAROTID BILAT) *VASCULAR* N/A    MRI (HEAD, NECK, SPINE) Internal MRI Lumbar Spine 9/14/23 - [facility]   CT (HEAD, NECK, SPINE) N/A

## 2023-09-19 ENCOUNTER — OFFICE VISIT (OUTPATIENT)
Dept: NEUROSURGERY | Facility: CLINIC | Age: 43
End: 2023-09-19
Attending: STUDENT IN AN ORGANIZED HEALTH CARE EDUCATION/TRAINING PROGRAM
Payer: COMMERCIAL

## 2023-09-19 VITALS — OXYGEN SATURATION: 99 % | HEART RATE: 56 BPM | DIASTOLIC BLOOD PRESSURE: 91 MMHG | SYSTOLIC BLOOD PRESSURE: 155 MMHG

## 2023-09-19 DIAGNOSIS — M54.41 ACUTE RIGHT-SIDED LOW BACK PAIN WITH RIGHT-SIDED SCIATICA: ICD-10-CM

## 2023-09-19 DIAGNOSIS — M51.369 BULGING LUMBAR DISC: ICD-10-CM

## 2023-09-19 DIAGNOSIS — M54.41 ACUTE RIGHT-SIDED LOW BACK PAIN WITH RIGHT-SIDED SCIATICA: Primary | ICD-10-CM

## 2023-09-19 PROCEDURE — G0463 HOSPITAL OUTPT CLINIC VISIT: HCPCS | Performed by: PHYSICIAN ASSISTANT

## 2023-09-19 PROCEDURE — 99203 OFFICE O/P NEW LOW 30 MIN: CPT | Performed by: PHYSICIAN ASSISTANT

## 2023-09-19 PROCEDURE — 99214 OFFICE O/P EST MOD 30 MIN: CPT | Performed by: PHYSICIAN ASSISTANT

## 2023-09-19 RX ORDER — METHYLPREDNISOLONE 4 MG
TABLET, DOSE PACK ORAL
Qty: 21 TABLET | Refills: 0 | Status: SHIPPED | OUTPATIENT
Start: 2023-09-19 | End: 2023-09-21

## 2023-09-19 ASSESSMENT — PAIN SCALES - GENERAL: PAINLEVEL: SEVERE PAIN (7)

## 2023-09-19 NOTE — LETTER
9/19/2023         RE: Reji Choi  [id number] [address] [id number]        Dear Colleague,    Thank you for referring your patient, Reji Choi, to the [facility] [place]. Please see a copy of my visit note below.    NEUROSURGERY CLINIC CONSULT NOTE     DATE OF VISIT: 9/19/2023     SUBJECTIVE:     Reji Choi is a pleasant 43 year old male who presents to the clinic today for consultation on lumbar spine pain with right leg radiculopathy. He is referred to the Neurosurgery Clinic by Dr. Leal in Primary Care.   Today, he reports a 3-month history of symptoms. He describes a daily with fluctuating intensity, sharp, aching, burning pain that initiates in the midline low lumbar region and radiates distally in what sounds like the right S1 distribution. This pain is not accompanied by paresthesia, numbness or perceived weakness in the same distribution. Prolonged walking, standing and sitting seems to aggravate the symptoms, while alleviation is obtained by positional changes. No mechanism of injury such as trauma or a fall is associated with the onset of the pain. There are no bowel or bladder changes. He denies saddle anesthesia. He denies changes in gait, instability, or falling episodes. He has participated in conservative therapies to include physical therapy, chiropractic care, NSAID's, and Medrol Dosepak. None of these modalities have provided any significant long term relief.          Current Outpatient Medications:      methylPREDNISolone (MEDROL DOSEPAK) 4 MG tablet therapy pack, Follow Package Directions, Disp: 21 tablet, Rfl: 0     acetaminophen (TYLENOL) 500 MG tablet, Take 1,000 mg by mouth every 4 hours as needed for mild pain, Disp: , Rfl:      colestipol (COLESTID) 1 g tablet, Take 2 g by mouth 2 times daily , Disp: , Rfl:      desonide (DESOWEN) 0.05 % external cream, Apply topically 2 times daily as needed (irritation), Disp: , Rfl:       escitalopram (LEXAPRO) 20 MG tablet, Take 20 mg by mouth daily, Disp: , Rfl:      ezetimibe (ZETIA) 10 MG tablet, Take 10 mg by mouth daily, Disp: , Rfl:      gabapentin (NEURONTIN) 100 MG capsule, Take 100-200 mg by mouth 3 times daily, Disp: , Rfl:      ibuprofen (ADVIL/MOTRIN) 200 MG tablet, Take 800 mg by mouth every 8 hours as needed for pain, Disp: , Rfl:      losartan (COZAAR) 100 MG tablet, Take 100 mg by mouth daily , Disp: , Rfl:      methocarbamol (ROBAXIN) 500 MG tablet, Take 1 tablet by mouth 3 times daily as needed for muscle spasms, Disp: , Rfl:      methylPREDNISolone (MEDROL DOSEPAK) 4 MG tablet therapy pack, USE AS DIRECTED, DO NOT TAKE ANY OTHER NSAID S WHILE USING THIS MEDICATON, Disp: , Rfl:      metoprolol succinate ER (TOPROL-XL) 200 MG 24 hr tablet, Take 200 mg by mouth daily, Disp: , Rfl:      omeprazole (PRILOSEC) 20 MG DR capsule, Take 20 mg by mouth daily , Disp: , Rfl:      OXcarbazepine (TRILEPTAL) 150 MG tablet, Take 150 mg by mouth 2 times daily, Disp: , Rfl:      predniSONE (DELTASONE) 20 MG tablet, Take two tablets (= 40mg) each day for 5 (five) days, Disp: 10 tablet, Rfl: 0     risperiDONE (RISPERDAL) 0.5 MG tablet, Take 0.25 mg by mouth 2 times daily as needed (anxiety), Disp: , Rfl:      Allergies   Allergen Reactions     Ampicillin-Sulbactam Sodium Anaphylaxis     Clindamycin Other (See Comments)     Canker sores in mouth     Ketorolac Other (See Comments)     Severe agitation     Pravastatin Muscle Pain (Myalgia)     Liver stress     Rosuvastatin Muscle Pain (Myalgia)     Liver stress     Simvastatin Muscle Pain (Myalgia)     Liver stress        No past medical history on file.     ROS: 10 point review of symptoms are negative other than the symptoms noted above in the HPI.     Family History has been reviewed with the patient, there are no pertinent findings to presenting concern.     Past Surgical History:   Procedure Laterality Date     CHOLECYSTECTOMY          Social  History     Tobacco Use     Smoking status: Former     Smokeless tobacco: Never     Tobacco comments:     eight months ago   Substance Use Topics     Alcohol use: Yes     Comment: occ     Drug use: Yes     Types: Marijuana     Comment: prescription         OBJECTIVE:   BP (!) 155/91   Pulse 56   SpO2 99%    There is no height or weight on file to calculate BMI.     Imaging:     MRI LUMBAR SPINE WITHOUT CONTRAST 9/14/2023 11:26 AM     Findings, per radiologist read, notable for:      Degenerative change as detailed, worst at L5-S1 and L4-L5.     Full radiological report in chart. Imaging was reviewed with with patient today.     Exam:     Patient appears comfortable, conversational, and in no apparent distress.   Head: Normocephalic, without obvious abnormality, atraumatic, no facial asymmetry.   Eyes: conjunctivae/corneas clear. PERRL, EOM's intact.   Throat: lips, mucosa, and tongue normal; teeth and gums normal.   Neck: supple, symmetrical, trachea midline, no adenopathy and thyroid: not enlarged, symmetric, no tenderness/mass/nodules.   Lungs: clear to auscultation bilaterally.   Heart: regular rate and rhythm.   Abdomen: soft, non-tender; bowel sounds normal; no masses, no organomegaly.   Pulses: 2+ and symmetric.   Skin: Skin color, texture, turgor normal. No rashes or lesions.     CN II-XII grossly intact, alert and appropriate with conversation and following commands.   Gait is non-antalgic. Able to tandem walk. Able to walk on toes and heels without difficulty.   Cervical spine is non tender to palpation. Appropriate range of motion of neck, not concerning for lhermitte's phenomenon.   Bilateral bicep 2/4 and tricep reflexes 1/4. Sensation intact throughout upper extremities.     UE muscle strength  Right  Left    Deltoid  5/5  5/5    Biceps  5/5  5/5    Triceps  5/5  5/5    Hand intrinsics  5/5  5/5    Hand grasp  5/5  5/5    Benoit signs  neg  neg      Lumbar spine is non tender to palpation.  Intact  sensation throughout lower extremities.   Bilateral patellar 2/4 and achilles reflex 1/4.   Negative for pain with straight leg raise.     LE muscle strength  Right  Left    Iliopsoas (hip flexion)  5/5  5/5    Quad (knee extension)  5/5  5/5    Hamstring (knee flexion)  5/5  5/5    Gastrocnemius (PF)  5/5  5/5    Tibialis Ant. (DF)  5/5  5/5    EHL  5/5  5/5      Negative Babinski bilaterally. Negative for clonus.   Calves are soft and non-tender bilaterally.       ASSESSMENT/PLAN:     Reji Choi is a 43 year old male who presents to the clinic for consultation on 3-month history of a daily with fluctuating intensity, sharp, aching, burning pain that initiates in the midline low lumbar region and radiates distally in what sounds like the right S1 distribution. This pain is not accompanied by paresthesia, numbness or perceived weakness in the same distribution. The patient's most recent imaging was reviewed with him today. It was explained that images show degenerative change as detailed, worst at L5-S1 and L4-L5, which correlates to today's clinical examination. On exam, he is noted to have appropriate strength, sensation and range of motion. He has attempted conservative management with physical therapy, chiropractic care, NSAID's, and Medrol Dosepak, without resolution of symptoms.     We will have him meet with Dr. Sharma to further discuss surgical options.           Respectfully,     KERA Madrigal PA-C  [facility] Neurosurgery  [facility]  Tel: [phone]        Exam, imaging, and plan reviewed by Dr. Sharma.       Again, thank you for allowing me to participate in the care of your patient.        Sincerely,        Gene Perez PA-C

## 2023-09-19 NOTE — PROGRESS NOTES
NEUROSURGERY CLINIC CONSULT NOTE     DATE OF VISIT: 9/19/2023     SUBJECTIVE:     Reji Choi is a pleasant 43 year old male who presents to the clinic today for consultation on lumbar spine pain with right leg radiculopathy. He is referred to the Neurosurgery Clinic by Dr. Leal in Primary Care.   Today, he reports a 3-month history of symptoms. He describes a daily with fluctuating intensity, sharp, aching, burning pain that initiates in the midline low lumbar region and radiates distally in what sounds like the right S1 distribution. This pain is not accompanied by paresthesia, numbness or perceived weakness in the same distribution. Prolonged walking, standing and sitting seems to aggravate the symptoms, while alleviation is obtained by positional changes. No mechanism of injury such as trauma or a fall is associated with the onset of the pain. There are no bowel or bladder changes. He denies saddle anesthesia. He denies changes in gait, instability, or falling episodes. He has participated in conservative therapies to include physical therapy, chiropractic care, NSAID's, and Medrol Dosepak. None of these modalities have provided any significant long term relief.          Current Outpatient Medications:     methylPREDNISolone (MEDROL DOSEPAK) 4 MG tablet therapy pack, Follow Package Directions, Disp: 21 tablet, Rfl: 0    acetaminophen (TYLENOL) 500 MG tablet, Take 1,000 mg by mouth every 4 hours as needed for mild pain, Disp: , Rfl:     colestipol (COLESTID) 1 g tablet, Take 2 g by mouth 2 times daily , Disp: , Rfl:     desonide (DESOWEN) 0.05 % external cream, Apply topically 2 times daily as needed (irritation), Disp: , Rfl:     escitalopram (LEXAPRO) 20 MG tablet, Take 20 mg by mouth daily, Disp: , Rfl:     ezetimibe (ZETIA) 10 MG tablet, Take 10 mg by mouth daily, Disp: , Rfl:     gabapentin (NEURONTIN) 100 MG capsule, Take 100-200 mg by mouth 3 times daily, Disp: , Rfl:     ibuprofen (ADVIL/MOTRIN) 200  MG tablet, Take 800 mg by mouth every 8 hours as needed for pain, Disp: , Rfl:     losartan (COZAAR) 100 MG tablet, Take 100 mg by mouth daily , Disp: , Rfl:     methocarbamol (ROBAXIN) 500 MG tablet, Take 1 tablet by mouth 3 times daily as needed for muscle spasms, Disp: , Rfl:     methylPREDNISolone (MEDROL DOSEPAK) 4 MG tablet therapy pack, USE AS DIRECTED, DO NOT TAKE ANY OTHER NSAID S WHILE USING THIS MEDICATON, Disp: , Rfl:     metoprolol succinate ER (TOPROL-XL) 200 MG 24 hr tablet, Take 200 mg by mouth daily, Disp: , Rfl:     omeprazole (PRILOSEC) 20 MG DR capsule, Take 20 mg by mouth daily , Disp: , Rfl:     OXcarbazepine (TRILEPTAL) 150 MG tablet, Take 150 mg by mouth 2 times daily, Disp: , Rfl:     predniSONE (DELTASONE) 20 MG tablet, Take two tablets (= 40mg) each day for 5 (five) days, Disp: 10 tablet, Rfl: 0    risperiDONE (RISPERDAL) 0.5 MG tablet, Take 0.25 mg by mouth 2 times daily as needed (anxiety), Disp: , Rfl:      Allergies   Allergen Reactions    Ampicillin-Sulbactam Sodium Anaphylaxis    Clindamycin Other (See Comments)     Canker sores in mouth    Ketorolac Other (See Comments)     Severe agitation    Pravastatin Muscle Pain (Myalgia)     Liver stress    Rosuvastatin Muscle Pain (Myalgia)     Liver stress    Simvastatin Muscle Pain (Myalgia)     Liver stress        No past medical history on file.     ROS: 10 point review of symptoms are negative other than the symptoms noted above in the HPI.     Family History has been reviewed with the patient, there are no pertinent findings to presenting concern.     Past Surgical History:   Procedure Laterality Date    CHOLECYSTECTOMY          Social History     Tobacco Use    Smoking status: Former    Smokeless tobacco: Never    Tobacco comments:     eight months ago   Substance Use Topics    Alcohol use: Yes     Comment: occ    Drug use: Yes     Types: Marijuana     Comment: prescription         OBJECTIVE:   BP (!) 155/91   Pulse 56   SpO2 99%     There is no height or weight on file to calculate BMI.     Imaging:     MRI LUMBAR SPINE WITHOUT CONTRAST 9/14/2023 11:26 AM     Findings, per radiologist read, notable for:      Degenerative change as detailed, worst at L5-S1 and L4-L5.     Full radiological report in chart. Imaging was reviewed with with patient today.     Exam:     Patient appears comfortable, conversational, and in no apparent distress.   Head: Normocephalic, without obvious abnormality, atraumatic, no facial asymmetry.   Eyes: conjunctivae/corneas clear. PERRL, EOM's intact.   Throat: lips, mucosa, and tongue normal; teeth and gums normal.   Neck: supple, symmetrical, trachea midline, no adenopathy and thyroid: not enlarged, symmetric, no tenderness/mass/nodules.   Lungs: clear to auscultation bilaterally.   Heart: regular rate and rhythm.   Abdomen: soft, non-tender; bowel sounds normal; no masses, no organomegaly.   Pulses: 2+ and symmetric.   Skin: Skin color, texture, turgor normal. No rashes or lesions.     CN II-XII grossly intact, alert and appropriate with conversation and following commands.   Gait is non-antalgic. Able to tandem walk. Able to walk on toes and heels without difficulty.   Cervical spine is non tender to palpation. Appropriate range of motion of neck, not concerning for lhermitte's phenomenon.   Bilateral bicep 2/4 and tricep reflexes 1/4. Sensation intact throughout upper extremities.     UE muscle strength  Right  Left    Deltoid  5/5  5/5    Biceps  5/5  5/5    Triceps  5/5  5/5    Hand intrinsics  5/5  5/5    Hand grasp  5/5  5/5    Benoit signs  neg  neg      Lumbar spine is non tender to palpation.  Intact sensation throughout lower extremities.   Bilateral patellar 2/4 and achilles reflex 1/4.   Negative for pain with straight leg raise.     LE muscle strength  Right  Left    Iliopsoas (hip flexion)  5/5  5/5    Quad (knee extension)  5/5  5/5    Hamstring (knee flexion)  5/5  5/5    Gastrocnemius (PF)  5/5   5/5    Tibialis Ant. (DF)  5/5  5/5    EHL  5/5  5/5      Negative Babinski bilaterally. Negative for clonus.   Calves are soft and non-tender bilaterally.       ASSESSMENT/PLAN:     Reji Choi is a 43 year old male who presents to the clinic for consultation on 3-month history of a daily with fluctuating intensity, sharp, aching, burning pain that initiates in the midline low lumbar region and radiates distally in what sounds like the right S1 distribution. This pain is not accompanied by paresthesia, numbness or perceived weakness in the same distribution. The patient's most recent imaging was reviewed with him today. It was explained that images show degenerative change as detailed, worst at L5-S1 and L4-L5, which correlates to today's clinical examination. On exam, he is noted to have appropriate strength, sensation and range of motion. He has attempted conservative management with physical therapy, chiropractic care, NSAID's, and Medrol Dosepak, without resolution of symptoms.     We will have him meet with Dr. Sharma to further discuss surgical options.           Respectfully,     KERA Madrigal, PALOREE  [facility] Neurosurgery  [facility]  Tel: [phone]        Exam, imaging, and plan reviewed by Dr. Sharma.

## 2023-09-19 NOTE — NURSING NOTE
Reji Choi is a 43 year old male who presents for:  Chief Complaint   Patient presents with    Back Pain        Vitals:    Vitals:    09/19/23 1357   BP: (!) 155/91   Pulse: 56   SpO2: 99%       BMI:  Estimated body mass index is 23.73 kg/m  as calculated from the following:    Height as of 12/24/22: 6' (1.829 m).    Weight as of 9/14/23: 175 lb (79.4 kg).    Pain Score:  Severe Pain (7)        Tawana Anderson

## 2023-09-21 ENCOUNTER — HOSPITAL ENCOUNTER (EMERGENCY)
Facility: CLINIC | Age: 43
Discharge: HOME | End: 2023-09-21
Attending: EMERGENCY MEDICINE | Admitting: EMERGENCY MEDICINE
Payer: COMMERCIAL

## 2023-09-21 ENCOUNTER — ANCILLARY PROCEDURE (OUTPATIENT)
Dept: GENERAL RADIOLOGY | Facility: CLINIC | Age: 43
End: 2023-09-21
Attending: STUDENT IN AN ORGANIZED HEALTH CARE EDUCATION/TRAINING PROGRAM
Payer: COMMERCIAL

## 2023-09-21 ENCOUNTER — OFFICE VISIT (OUTPATIENT)
Dept: NEUROSURGERY | Facility: CLINIC | Age: 43
End: 2023-09-21
Payer: COMMERCIAL

## 2023-09-21 VITALS
SYSTOLIC BLOOD PRESSURE: 107 MMHG | BODY MASS INDEX: 23.7 KG/M2 | WEIGHT: 175 LBS | HEART RATE: 69 BPM | HEIGHT: 72 IN | DIASTOLIC BLOOD PRESSURE: 103 MMHG

## 2023-09-21 VITALS
HEART RATE: 89 BPM | HEIGHT: 72 IN | SYSTOLIC BLOOD PRESSURE: 165 MMHG | TEMPERATURE: 98.3 F | OXYGEN SATURATION: 99 % | BODY MASS INDEX: 24.11 KG/M2 | DIASTOLIC BLOOD PRESSURE: 94 MMHG | RESPIRATION RATE: 18 BRPM | WEIGHT: 178 LBS

## 2023-09-21 DIAGNOSIS — M51.369 BULGING LUMBAR DISC: ICD-10-CM

## 2023-09-21 DIAGNOSIS — M54.41 ACUTE RIGHT-SIDED LOW BACK PAIN WITH RIGHT-SIDED SCIATICA: ICD-10-CM

## 2023-09-21 DIAGNOSIS — M54.41 ACUTE RIGHT-SIDED LOW BACK PAIN WITH RIGHT-SIDED SCIATICA: Primary | ICD-10-CM

## 2023-09-21 PROCEDURE — 99284 EMERGENCY DEPT VISIT MOD MDM: CPT | Mod: 25 | Performed by: EMERGENCY MEDICINE

## 2023-09-21 PROCEDURE — 72120 X-RAY BEND ONLY L-S SPINE: CPT | Mod: XS | Performed by: STUDENT IN AN ORGANIZED HEALTH CARE EDUCATION/TRAINING PROGRAM

## 2023-09-21 PROCEDURE — 250N000011 HC RX IP 250 OP 636: Performed by: EMERGENCY MEDICINE

## 2023-09-21 PROCEDURE — 72082 X-RAY EXAM ENTIRE SPI 2/3 VW: CPT | Performed by: STUDENT IN AN ORGANIZED HEALTH CARE EDUCATION/TRAINING PROGRAM

## 2023-09-21 PROCEDURE — 99284 EMERGENCY DEPT VISIT MOD MDM: CPT | Performed by: EMERGENCY MEDICINE

## 2023-09-21 PROCEDURE — 99214 OFFICE O/P EST MOD 30 MIN: CPT | Performed by: STUDENT IN AN ORGANIZED HEALTH CARE EDUCATION/TRAINING PROGRAM

## 2023-09-21 PROCEDURE — 96372 THER/PROPH/DIAG INJ SC/IM: CPT | Performed by: EMERGENCY MEDICINE

## 2023-09-21 RX ORDER — METHOCARBAMOL 750 MG/1
750 TABLET, FILM COATED ORAL 4 TIMES DAILY PRN
Qty: 40 TABLET | Refills: 0 | Status: SHIPPED | OUTPATIENT
Start: 2023-09-21

## 2023-09-21 RX ORDER — LORAZEPAM 2 MG/ML
1 INJECTION INTRAMUSCULAR ONCE
Status: COMPLETED | OUTPATIENT
Start: 2023-09-21 | End: 2023-09-21

## 2023-09-21 RX ORDER — PREDNISONE 20 MG/1
TABLET ORAL
Qty: 20 TABLET | Refills: 0 | Status: SHIPPED | OUTPATIENT
Start: 2023-09-21

## 2023-09-21 RX ADMIN — LORAZEPAM 1 MG: 2 INJECTION INTRAMUSCULAR; INTRAVENOUS at 14:35

## 2023-09-21 RX ADMIN — HYDROMORPHONE HYDROCHLORIDE 1 MG: 1 INJECTION, SOLUTION INTRAMUSCULAR; INTRAVENOUS; SUBCUTANEOUS at 14:35

## 2023-09-21 ASSESSMENT — PAIN SCALES - GENERAL: PAINLEVEL: EXTREME PAIN (8)

## 2023-09-21 NOTE — DISCHARGE INSTRUCTIONS
Continue rest, ice or heat over painful area.  You can try Biofreeze or lidocaine patches.    Start prednisone as prescribed.  I am going to give you a long taper because I am not sure when you will be able to get your injection.    Robaxin as needed for muscle spasm.    If you have any concerns return at any time.    I hope this starts to improve very quickly!!

## 2023-09-21 NOTE — ED TRIAGE NOTES
Reports back pain and was seen at his pre-op appointment today.     Triage Assessment       Row Name 09/21/23 0335       Triage Assessment (Adult)    Airway WDL WDL       Respiratory WDL    Respiratory WDL WDL       Skin Circulation/Temperature WDL    Skin Circulation/Temperature WDL WDL       Cardiac WDL    Cardiac WDL WDL       Peripheral/Neurovascular WDL    Peripheral Neurovascular WDL WDL       Cognitive/Neuro/Behavioral WDL    Cognitive/Neuro/Behavioral WDL WDL

## 2023-09-21 NOTE — PROGRESS NOTES
HPI:  43-year-old male with a 3-month history of right-sided low back pain rating to the right lower extremity.  He states that the pain encompasses mostly the entire right leg down to the foot.  If he had not narrow down the pain he would localize it more to the posterior aspect of the right lower extremity.  The pain started after getting pulled out of a chair and then a couple of weeks later stepping in a hole and falling.  Since this time the pain has exacerbated.  He notes objective weakness associated with this.  He is tried over-the-counter medications but no physical therapy or injections to this point.  Has done physical therapy in the past for chronic low back pain which has helped.  He denies any left leg pain.  Current Outpatient Medications   Medication    acetaminophen (TYLENOL) 500 MG tablet    colestipol (COLESTID) 1 g tablet    desonide (DESOWEN) 0.05 % external cream    escitalopram (LEXAPRO) 20 MG tablet    ezetimibe (ZETIA) 10 MG tablet    gabapentin (NEURONTIN) 100 MG capsule    ibuprofen (ADVIL/MOTRIN) 200 MG tablet    losartan (COZAAR) 100 MG tablet    methocarbamol (ROBAXIN) 500 MG tablet    methylPREDNISolone (MEDROL DOSEPAK) 4 MG tablet therapy pack    methylPREDNISolone (MEDROL DOSEPAK) 4 MG tablet therapy pack    metoprolol succinate ER (TOPROL-XL) 200 MG 24 hr tablet    omeprazole (PRILOSEC) 20 MG DR capsule    OXcarbazepine (TRILEPTAL) 150 MG tablet    predniSONE (DELTASONE) 20 MG tablet    risperiDONE (RISPERDAL) 0.5 MG tablet     No current facility-administered medications for this visit.      Physical Exam:  Vital signs:      BP: (!) 107/103 Pulse: 69           Height: 182.9 cm (6') Weight: 79.4 kg (175 lb)  Estimated body mass index is 23.73 kg/m  as calculated from the following:    Height as of this encounter: 1.829 m (6').    Weight as of this encounter: 79.4 kg (175 lb).  He has full strength in his bilateral lower extremities.  Sensation is intact light touch throughout.   Ankle and patellar reflexes are 2+ bilaterally.  Results Reviewed:  I personally viewed the images of an MRI of the lumbar spine.  This shows an L4-5 and L5-S1 disc bulge/herniation with no significant central canal stenosis present.  There is mild lateral recess stenosis present at L4-5.  There is right greater than left lateral recess stenosis present at L5-S1.  The right S1 nerve at the L5-S1 level does appear to be slightly inflamed and posterior deviated although not compressed at this level.  There also does appear to be a small daughter herniation at this level near the S1 nerve root.  Assessment:  43-year-old male with a likely right S1 radiculopathy without improvement to this point.  Has not tried usual conservative measures to this point.  Plan:  We will start with physical therapy for the lumbar spine and radiculopathy as well as schedule him for an epidural steroid injection at L5-S1.  We discussed that if things do not improve with conservative management surgery may be an option.  This would likely entail a laminotomy and discectomy at L5-S1 on the right side.  Based on his symptom pattern and the imaging findings I do believe that the S1 nerve is the most likely cause of his symptoms.  If he does not prove with conservative management we will have him call back and set up a phone visit to discuss surgery in the future.  He can otherwise follow-up with us as needed.    Jae Sharma MD

## 2023-09-21 NOTE — LETTER
9/21/2023         RE: Reji Choi  [id number] [facility] [id number]        Dear Colleague,    Thank you for referring your patient, Reji Choi, to the [facility] [place]. Please see a copy of my visit note below.    HPI:  43-year-old male with a 3-month history of right-sided low back pain rating to the right lower extremity.  He states that the pain encompasses mostly the entire right leg down to the foot.  If he had not narrow down the pain he would localize it more to the posterior aspect of the right lower extremity.  The pain started after getting pulled out of a chair and then a couple of weeks later stepping in a hole and falling.  Since this time the pain has exacerbated.  He notes objective weakness associated with this.  He is tried over-the-counter medications but no physical therapy or injections to this point.  Has done physical therapy in the past for chronic low back pain which has helped.  He denies any left leg pain.  Current Outpatient Medications   Medication     acetaminophen (TYLENOL) 500 MG tablet     colestipol (COLESTID) 1 g tablet     desonide (DESOWEN) 0.05 % external cream     escitalopram (LEXAPRO) 20 MG tablet     ezetimibe (ZETIA) 10 MG tablet     gabapentin (NEURONTIN) 100 MG capsule     ibuprofen (ADVIL/MOTRIN) 200 MG tablet     losartan (COZAAR) 100 MG tablet     methocarbamol (ROBAXIN) 500 MG tablet     methylPREDNISolone (MEDROL DOSEPAK) 4 MG tablet therapy pack     methylPREDNISolone (MEDROL DOSEPAK) 4 MG tablet therapy pack     metoprolol succinate ER (TOPROL-XL) 200 MG 24 hr tablet     omeprazole (PRILOSEC) 20 MG DR capsule     OXcarbazepine (TRILEPTAL) 150 MG tablet     predniSONE (DELTASONE) 20 MG tablet     risperiDONE (RISPERDAL) 0.5 MG tablet     No current facility-administered medications for this visit.      Physical Exam:  Vital signs:      BP: (!) 107/103 Pulse: 69           Height: 182.9 cm (6') Weight: 79.4 kg (175  lb)  Estimated body mass index is 23.73 kg/m  as calculated from the following:    Height as of this encounter: 1.829 m (6').    Weight as of this encounter: 79.4 kg (175 lb).  He has full strength in his bilateral lower extremities.  Sensation is intact light touch throughout.  Ankle and patellar reflexes are 2+ bilaterally.  Results Reviewed:  I personally viewed the images of an MRI of the lumbar spine.  This shows an L4-5 and L5-S1 disc bulge/herniation with no significant central canal stenosis present.  There is mild lateral recess stenosis present at L4-5.  There is right greater than left lateral recess stenosis present at L5-S1.  The right S1 nerve at the L5-S1 level does appear to be slightly inflamed and posterior deviated although not compressed at this level.  There also does appear to be a small daughter herniation at this level near the S1 nerve root.  Assessment:  43-year-old male with a likely right S1 radiculopathy without improvement to this point.  Has not tried usual conservative measures to this point.  Plan:  We will start with physical therapy for the lumbar spine and radiculopathy as well as schedule him for an epidural steroid injection at L5-S1.  We discussed that if things do not improve with conservative management surgery may be an option.  This would likely entail a laminotomy and discectomy at L5-S1 on the right side.  Based on his symptom pattern and the imaging findings I do believe that the S1 nerve is the most likely cause of his symptoms.  If he does not prove with conservative management we will have him call back and set up a phone visit to discuss surgery in the future.  He can otherwise follow-up with us as needed.    Jae Sharma MD      Again, thank you for allowing me to participate in the care of your patient.        Sincerely,        Jae Sharma MD

## 2023-09-22 ENCOUNTER — NURSE TRIAGE (OUTPATIENT)
Dept: NURSING | Facility: CLINIC | Age: 43
End: 2023-09-22
Payer: COMMERCIAL

## 2023-09-22 ENCOUNTER — APPOINTMENT (OUTPATIENT)
Dept: MRI IMAGING | Facility: CLINIC | Age: 43
End: 2023-09-22
Attending: STUDENT IN AN ORGANIZED HEALTH CARE EDUCATION/TRAINING PROGRAM
Payer: COMMERCIAL

## 2023-09-22 ENCOUNTER — APPOINTMENT (OUTPATIENT)
Dept: CT IMAGING | Facility: CLINIC | Age: 43
End: 2023-09-22
Attending: STUDENT IN AN ORGANIZED HEALTH CARE EDUCATION/TRAINING PROGRAM
Payer: COMMERCIAL

## 2023-09-22 ENCOUNTER — HOSPITAL ENCOUNTER (EMERGENCY)
Facility: CLINIC | Age: 43
Discharge: HOME | End: 2023-09-22
Attending: STUDENT IN AN ORGANIZED HEALTH CARE EDUCATION/TRAINING PROGRAM | Admitting: STUDENT IN AN ORGANIZED HEALTH CARE EDUCATION/TRAINING PROGRAM
Payer: COMMERCIAL

## 2023-09-22 VITALS
WEIGHT: 178 LBS | BODY MASS INDEX: 24.14 KG/M2 | RESPIRATION RATE: 18 BRPM | DIASTOLIC BLOOD PRESSURE: 76 MMHG | SYSTOLIC BLOOD PRESSURE: 145 MMHG | HEART RATE: 61 BPM | TEMPERATURE: 98 F | OXYGEN SATURATION: 94 %

## 2023-09-22 DIAGNOSIS — H53.9 VISION CHANGES: ICD-10-CM

## 2023-09-22 LAB
ALBUMIN UR-MCNC: NEGATIVE MG/DL
AMPHETAMINES UR QL SCN: ABNORMAL
ANION GAP SERPL CALCULATED.3IONS-SCNC: 14 MMOL/L (ref 7–15)
APPEARANCE UR: CLEAR
BARBITURATES UR QL SCN: ABNORMAL
BASOPHILS # BLD AUTO: 0 10E3/UL (ref 0–0.2)
BASOPHILS NFR BLD AUTO: 0 %
BENZODIAZ UR QL SCN: ABNORMAL
BILIRUB UR QL STRIP: NEGATIVE
BUN SERPL-MCNC: 14.1 MG/DL (ref 6–20)
BZE UR QL SCN: ABNORMAL
CALCIUM SERPL-MCNC: 9.5 MG/DL (ref 8.6–10)
CANNABINOIDS UR QL SCN: ABNORMAL
CHLORIDE SERPL-SCNC: 98 MMOL/L (ref 98–107)
COLOR UR AUTO: YELLOW
CREAT SERPL-MCNC: 0.73 MG/DL (ref 0.67–1.17)
CRP SERPL-MCNC: <3 MG/L
DEPRECATED HCO3 PLAS-SCNC: 22 MMOL/L (ref 22–29)
EGFRCR SERPLBLD CKD-EPI 2021: >90 ML/MIN/1.73M2
EOSINOPHIL # BLD AUTO: 0 10E3/UL (ref 0–0.7)
EOSINOPHIL NFR BLD AUTO: 0 %
ERYTHROCYTE [DISTWIDTH] IN BLOOD BY AUTOMATED COUNT: 12.9 % (ref 10–15)
ERYTHROCYTE [SEDIMENTATION RATE] IN BLOOD BY WESTERGREN METHOD: 6 MM/HR (ref 0–15)
FENTANYL UR QL: ABNORMAL
GLUCOSE SERPL-MCNC: 136 MG/DL (ref 70–99)
GLUCOSE UR STRIP-MCNC: NEGATIVE MG/DL
HCT VFR BLD AUTO: 35.7 % (ref 40–53)
HGB BLD-MCNC: 12.5 G/DL (ref 13.3–17.7)
HGB UR QL STRIP: NEGATIVE
IMM GRANULOCYTES # BLD: 0.2 10E3/UL
IMM GRANULOCYTES NFR BLD: 1 %
INR PPP: 0.99 (ref 0.85–1.15)
KETONES UR STRIP-MCNC: NEGATIVE MG/DL
LEUKOCYTE ESTERASE UR QL STRIP: NEGATIVE
LYMPHOCYTES # BLD AUTO: 0.9 10E3/UL (ref 0.8–5.3)
LYMPHOCYTES NFR BLD AUTO: 4 %
MCH RBC QN AUTO: 31.3 PG (ref 26.5–33)
MCHC RBC AUTO-ENTMCNC: 35 G/DL (ref 31.5–36.5)
MCV RBC AUTO: 90 FL (ref 78–100)
MONOCYTES # BLD AUTO: 0.4 10E3/UL (ref 0–1.3)
MONOCYTES NFR BLD AUTO: 2 %
MUCOUS THREADS #/AREA URNS LPF: PRESENT /LPF
NEUTROPHILS # BLD AUTO: 21 10E3/UL (ref 1.6–8.3)
NEUTROPHILS NFR BLD AUTO: 93 %
NITRATE UR QL: NEGATIVE
NRBC # BLD AUTO: 0 10E3/UL
NRBC BLD AUTO-RTO: 0 /100
OPIATES UR QL SCN: ABNORMAL
PCP QUAL URINE (ROCHE): ABNORMAL
PH UR STRIP: 6 [PH] (ref 5–7)
PLATELET # BLD AUTO: 379 10E3/UL (ref 150–450)
POTASSIUM SERPL-SCNC: 4.8 MMOL/L (ref 3.4–5.3)
PROCALCITONIN SERPL IA-MCNC: 0.03 NG/ML
RBC # BLD AUTO: 3.99 10E6/UL (ref 4.4–5.9)
RBC URINE: 1 /HPF
SODIUM SERPL-SCNC: 134 MMOL/L (ref 136–145)
SP GR UR STRIP: 1.03 (ref 1–1.03)
SQUAMOUS EPITHELIAL: <1 /HPF
UROBILINOGEN UR STRIP-MCNC: NORMAL MG/DL
WBC # BLD AUTO: 22.5 10E3/UL (ref 4–11)
WBC URINE: <1 /HPF

## 2023-09-22 PROCEDURE — 250N000009 HC RX 250: Performed by: STUDENT IN AN ORGANIZED HEALTH CARE EDUCATION/TRAINING PROGRAM

## 2023-09-22 PROCEDURE — 99285 EMERGENCY DEPT VISIT HI MDM: CPT | Mod: 25 | Performed by: STUDENT IN AN ORGANIZED HEALTH CARE EDUCATION/TRAINING PROGRAM

## 2023-09-22 PROCEDURE — 70496 CT ANGIOGRAPHY HEAD: CPT

## 2023-09-22 PROCEDURE — 85610 PROTHROMBIN TIME: CPT | Performed by: STUDENT IN AN ORGANIZED HEALTH CARE EDUCATION/TRAINING PROGRAM

## 2023-09-22 PROCEDURE — 250N000011 HC RX IP 250 OP 636: Performed by: STUDENT IN AN ORGANIZED HEALTH CARE EDUCATION/TRAINING PROGRAM

## 2023-09-22 PROCEDURE — 96375 TX/PRO/DX INJ NEW DRUG ADDON: CPT | Mod: 59 | Performed by: STUDENT IN AN ORGANIZED HEALTH CARE EDUCATION/TRAINING PROGRAM

## 2023-09-22 PROCEDURE — 85025 COMPLETE CBC W/AUTO DIFF WBC: CPT | Performed by: STUDENT IN AN ORGANIZED HEALTH CARE EDUCATION/TRAINING PROGRAM

## 2023-09-22 PROCEDURE — 93005 ELECTROCARDIOGRAM TRACING: CPT | Performed by: STUDENT IN AN ORGANIZED HEALTH CARE EDUCATION/TRAINING PROGRAM

## 2023-09-22 PROCEDURE — 36415 COLL VENOUS BLD VENIPUNCTURE: CPT | Performed by: STUDENT IN AN ORGANIZED HEALTH CARE EDUCATION/TRAINING PROGRAM

## 2023-09-22 PROCEDURE — 70450 CT HEAD/BRAIN W/O DYE: CPT

## 2023-09-22 PROCEDURE — 84145 PROCALCITONIN (PCT): CPT | Performed by: STUDENT IN AN ORGANIZED HEALTH CARE EDUCATION/TRAINING PROGRAM

## 2023-09-22 PROCEDURE — 96374 THER/PROPH/DIAG INJ IV PUSH: CPT | Mod: 59 | Performed by: STUDENT IN AN ORGANIZED HEALTH CARE EDUCATION/TRAINING PROGRAM

## 2023-09-22 PROCEDURE — 93010 ELECTROCARDIOGRAM REPORT: CPT | Performed by: STUDENT IN AN ORGANIZED HEALTH CARE EDUCATION/TRAINING PROGRAM

## 2023-09-22 PROCEDURE — 86140 C-REACTIVE PROTEIN: CPT | Performed by: STUDENT IN AN ORGANIZED HEALTH CARE EDUCATION/TRAINING PROGRAM

## 2023-09-22 PROCEDURE — 85652 RBC SED RATE AUTOMATED: CPT | Performed by: STUDENT IN AN ORGANIZED HEALTH CARE EDUCATION/TRAINING PROGRAM

## 2023-09-22 PROCEDURE — 96376 TX/PRO/DX INJ SAME DRUG ADON: CPT | Performed by: STUDENT IN AN ORGANIZED HEALTH CARE EDUCATION/TRAINING PROGRAM

## 2023-09-22 PROCEDURE — 80048 BASIC METABOLIC PNL TOTAL CA: CPT | Performed by: STUDENT IN AN ORGANIZED HEALTH CARE EDUCATION/TRAINING PROGRAM

## 2023-09-22 PROCEDURE — 80307 DRUG TEST PRSMV CHEM ANLYZR: CPT | Performed by: STUDENT IN AN ORGANIZED HEALTH CARE EDUCATION/TRAINING PROGRAM

## 2023-09-22 PROCEDURE — 70551 MRI BRAIN STEM W/O DYE: CPT

## 2023-09-22 PROCEDURE — 81001 URINALYSIS AUTO W/SCOPE: CPT | Performed by: STUDENT IN AN ORGANIZED HEALTH CARE EDUCATION/TRAINING PROGRAM

## 2023-09-22 RX ORDER — HYDROMORPHONE HYDROCHLORIDE 1 MG/ML
0.5 INJECTION, SOLUTION INTRAMUSCULAR; INTRAVENOUS; SUBCUTANEOUS ONCE
Status: COMPLETED | OUTPATIENT
Start: 2023-09-22 | End: 2023-09-22

## 2023-09-22 RX ORDER — IOPAMIDOL 755 MG/ML
500 INJECTION, SOLUTION INTRAVASCULAR ONCE
Status: COMPLETED | OUTPATIENT
Start: 2023-09-22 | End: 2023-09-22

## 2023-09-22 RX ORDER — LORAZEPAM 2 MG/ML
1 INJECTION INTRAMUSCULAR ONCE
Status: COMPLETED | OUTPATIENT
Start: 2023-09-22 | End: 2023-09-22

## 2023-09-22 RX ADMIN — HYDROMORPHONE HYDROCHLORIDE 0.5 MG: 1 INJECTION, SOLUTION INTRAMUSCULAR; INTRAVENOUS; SUBCUTANEOUS at 12:48

## 2023-09-22 RX ADMIN — IOPAMIDOL 80 ML: 755 INJECTION, SOLUTION INTRAVENOUS at 11:30

## 2023-09-22 RX ADMIN — HYDROMORPHONE HYDROCHLORIDE 0.5 MG: 1 INJECTION, SOLUTION INTRAMUSCULAR; INTRAVENOUS; SUBCUTANEOUS at 16:25

## 2023-09-22 RX ADMIN — LORAZEPAM 1 MG: 2 INJECTION INTRAMUSCULAR; INTRAVENOUS at 12:46

## 2023-09-22 RX ADMIN — SODIUM CHLORIDE 100 ML: 9 INJECTION, SOLUTION INTRAVENOUS at 11:30

## 2023-09-22 ASSESSMENT — VISUAL ACUITY: OU: NO VISION

## 2023-09-22 ASSESSMENT — ACTIVITIES OF DAILY LIVING (ADL)
ADLS_ACUITY_SCORE: 35

## 2023-09-22 NOTE — TELEPHONE ENCOUNTER
"Can't see out of my right eye        S-(situation): right eye vision change    B-(background):   Started 20 minutes ago.  Pt seen at ED yesterday for low back pain and started oral steroids. 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    A-(assessment):   Pt sees \"lightning bolts\" or flashes of light in lower visual field of right eye    No eye pain  No headache    R-(recommendations):   Be seen today, FNA advised he would benefit from seeing an eye doctor. Pt verbalized that he is agitated due to steroids and prefers to go to the ED.    Saida West RN/[place] Nurse Advisor              Reason for Disposition   Flashes of light  (Exception: Brief from pressing on the eyeball.)    Additional Information   Negative: Weakness of the face, arm or leg on one side of the body   Negative: Followed getting substance in the eye   Negative: Foreign body stuck in the eye   Negative: Followed an eye injury   Negative: Followed sun lamp or sun exposure (UV keratitis)   Negative: Yellow or green discharge (pus) in the eye   Negative: Pregnant   Negative: Complete loss of vision in one or both eyes   Negative: SEVERE eye pain   Negative: SEVERE headache   Negative: Double vision   Negative: Blurred vision or visual changes and present now and sudden onset or new (e.g., minutes, hours, days)  (Exception: Seeing floaters / black specks OR previously diagnosed migraine headaches with same symptoms.)   Negative: Patient sounds very sick or weak to the triager    Protocols used: Vision Loss or Change-A-OH    "

## 2023-09-22 NOTE — CONSULTS
"[facility]    Stroke Telephone Note    I was called by Radha Bear on 09/22/23 regarding patient Reji Choi. The patient is a 43 year old male with hx of chronic back pain presents to the hospital with acute onset painless intermittent self resolving vision loss in R eye which is predominantly in central part of his vision.    BP (!) 160/96   Pulse 99   Temp 98  F (36.7  C) (Temporal)   Resp 18   Wt 80.7 kg (178 lb)   SpO2 99%   BMI 24.14 kg/m       Imaging Findings  CT head: Pending  CTA head/neck: Pending    Impression  Intermittent R eye vision changes    Recommendations  CT CTA head and neck  If above imaging negative for acute abnormalities recommend getting MRI brain with and without contrast  Call once above imaging are completed and/or resulted.        My recommendations are based on the information provided over the phone by Reji Choi's in-person providers. They are not intended to replace the clinical judgment of his in-person providers. I was not requested to personally see or examine the patient at this time.    Xochilt Langford MD  Vascular Neurology    To page me or covering stroke neurology team member, click here: [facility]COM  Choose \"On Call\" tab at top, then select \"NEUROLOGY/ALL SITES\" from middle drop-down box, press Enter, then look for \"stroke\" or \"telestroke\" for your site.         "

## 2023-09-22 NOTE — ED PROVIDER NOTES
History     Chief Complaint   Patient presents with    Back Pain     HPI  History per patient, medical records    This is a 43-year-old male, history of TRISHA on CPAP, hypertension, hyperlipidemia, GERD, fibromyalgia with chronic neck pain presenting with back pain.  Patient was seen in this ED on 9/14/2023 with 9 to 10 weeks of right low back pain with radiation to the right leg.  This started after his dog pulled him too hard on a leash causing him to fall onto the ground.  He apparently had been seen at an orthopedic urgent care and given steroids and the muscle relaxer.  It was recommended he have an MRI for follow-up.  He had pain medications and in ED lab work-up and then MRI with degenerative changes noted.  See below.  He was discharged on prednisone and given a small amount of Percocet.  Patient believes that the prednisone was the medication that helped him the most.  He followed up with neurosurgery [facility] on 9/19/2023 who recommended a surgical consult.  He was seen by neurosurgery, Dr. Sharma today.  Plan was for physical therapy and an epidural steroid injection.  Patient states that that clinic does not give pain medications.  He is looking for another course of steroids and muscle relaxants if possible.    MRI 9/14/2023:  MRI LUMBAR SPINE WITHOUT CONTRAST   9/14/2023 11:26 AM      HISTORY: Severe right lower back pain with radiation down his right  leg, describes urinary retention, waxing and waning symptoms for nine  weeks, exacerbated three days ago.     TECHNIQUE: Multiplanar multisequence MRI of the lumbar spine without  contrast.     COMPARISON: None.      FINDINGS:  For numbering purposes, the L1 vertebra is considered to have normal  variant tiny ribs with 12 rib-bearing vertebra superior to what is  considered to be L1 confirmed on the prior chest x-ray from 2/10/2023.  Alignment is significant for multilevel subtle grade 1  spondylolisthesis. Bone marrow demonstrates mild degenerative  endplate  changes including marrow edema (Modic 1) most conspicuous surrounding  L5-S1 disc joint. 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 Conus medullaris  is unremarkable terminating at the level of L1-L2 disc. Cauda equina  is unremarkable. Tiny areas of T2 hyperintense signal within the left  renal pelvis, probably benign.     Segmental Analysis:      T12-L1:  Disc height maintained. No herniation. Normal facet joints.  No foraminal or spinal canal stenosis.      L1-L2:  Disc height maintained. No herniation. Normal facet joints. No  foraminal or spinal canal stenosis.       L2-L3:  Mild disc height loss. Minimal bulge with annular fissuring.  Mild bilateral facet arthropathy. No foraminal or spinal canal  stenosis.       L3-L4:  Mild disc height loss. Minimal bulge with annular fissuring.  Mild right and moderate left facet arthropathy. No foraminal or spinal  canal stenosis.       L4-L5:  Mild disc height loss. Broad central protrusion in close  proximity to the bowel traversing L5 nerve roots. Mild bilateral facet  arthropathy. Mild bilateral foraminal stenosis. Mild spinal canal  stenosis.       L5-S1:  Mild to moderate disc height loss. Central disc extrusion,  with bilateral paracentral components abutting the bilateral  traversing S1 nerve roots within the lateral recesses with posterior  displacement of the traversing right S1 nerve root (series 6 image 47,  series 8 image 14). Mild bilateral facet arthropathy. Moderate  bilateral foraminal stenosis. Moderate spinal canal stenosis related  to the herniation.                                                                      IMPRESSION:    Degenerative change as detailed, worst at L5-S1 and L4-L5.    Allergies:  Allergies   Allergen Reactions    Ampicillin-Sulbactam Sodium Anaphylaxis    Clindamycin Other (See Comments)     Canker sores in mouth    Ketorolac Other (See Comments)     Severe  agitation    Pravastatin Muscle Pain (Myalgia)     Liver stress    Rosuvastatin Muscle Pain (Myalgia)     Liver stress    Simvastatin Muscle Pain (Myalgia)     Liver stress       Problem List:    Patient Active Problem List    Diagnosis Date Noted    Controlled substance agreement signed 02/01/2018     Priority: Medium     for tramadol      Chronic neck pain 10/03/2017     Priority: Medium    Biliary dyskinesia 08/29/2017     Priority: Medium    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 No pertinent past medical history.    Past Surgical History:    Past Surgical History:   Procedure Laterality Date    CHOLECYSTECTOMY         Family History:    No family history on file.    Social History:  Marital Status:   [2]  Social History     Tobacco Use    Smoking status: Former    Smokeless tobacco: Never    Tobacco comments:     eight months ago   Substance Use Topics    Alcohol use: Yes     Comment: occ    Drug use: Yes     Types: Marijuana     Comment: prescription         Medications:    methocarbamol (ROBAXIN) 750 MG tablet  predniSONE (DELTASONE) 20 MG tablet  acetaminophen (TYLENOL) 500 MG tablet  colestipol (COLESTID) 1 g tablet  desonide (DESOWEN) 0.05 % external cream  escitalopram (LEXAPRO) 20 MG tablet  ezetimibe (ZETIA) 10 MG tablet  gabapentin (NEURONTIN) 100 MG capsule  ibuprofen (ADVIL/MOTRIN) 200 MG tablet  losartan (COZAAR) 100 MG tablet  metoprolol succinate ER (TOPROL-XL) 200 MG 24 hr tablet  omeprazole (PRILOSEC) 20  MG DR capsule  OXcarbazepine (TRILEPTAL) 150 MG tablet  risperiDONE (RISPERDAL) 0.5 MG tablet          Review of Systems  All other ROS reviewed and are negative or non-contributory except as stated in HPI.     Physical Exam   BP: (!) 163/93  Pulse: 89  Temp: 98.3  F (36.8  C)  Resp: 18  Height: 182.9 cm (6')  Weight: 80.7 kg (178 lb)  SpO2: 99 %      Physical Exam  Vitals and nursing note reviewed.   Constitutional:       Appearance: Normal appearance. He is normal weight.      Comments: Uncomfortable appearing male pacing in the room   HENT:      Head: Normocephalic.   Eyes:      Extraocular Movements: Extraocular movements intact.      Conjunctiva/sclera: Conjunctivae normal.   Cardiovascular:      Rate and Rhythm: Normal rate.   Pulmonary:      Effort: Pulmonary effort is normal.   Musculoskeletal:      Cervical back: Normal range of motion.      Comments: Some antalgic gait, difficulty bearing weight on the right.   Skin:     General: Skin is warm and dry.   Neurological:      General: No focal deficit present.      Mental Status: He is alert.   Psychiatric:         Behavior: Behavior normal.      Comments: Anxious         ED Course (with Medical Decision Making)    Pt seen and examined by me.  RN and EPIC notes reviewed.       Patient with known degenerative lumbar disc disease presenting with pain.  He just was seen by neurosurgery and is in the process of being scheduled for an epidural steroid injection.  I gave him an IM dose of Dilaudid and Ativan here in the ED.  I refilled a prednisone taper and Robaxin.  He has good follow-up and knows he can return to the ED for significant worsening, changes or concerns.     Results for orders placed or performed in visit on 09/21/23 (from the past 24 hour(s))   XR Lumbar Bending Only 2/3 Views    Narrative    EXAM: XR SPINE COMPLETE SCOLIOSIS 2 VIEWS, XR LUMBAR BENDING ONLY 2/3 VIEWS  LOCATION: [facility]  DATE: 9/21/2023    INDICATION:  Acute right-sided low back pain with right-sided sciatica. Bulging lumbar disc.  COMPARISON: None.      Impression    IMPRESSION:  12 thoracic rib-bearing and 5 lumbar type vertebrae. Mild multilevel interbody degenerative change, most notably at C5-C6 in the cervical spine, the mid thoracic levels, and L5-S1 in the lumbar spine. No acute fracture or compression deformity. No   spondylolisthesis or abnormal lumbar vertebral body motion on extension or flexion. Mild multilevel facet arthrosis, most notably at L3-S1. Mild in change of the hip joints and pubic symphysis. Cholecystectomy clips. Otherwise, no significant incidental   extraspinal abnormality.    Measurements:     Major Curve: Dextroconvex thoracic curvature of approximately 6 degrees as measured from the superior T3 endplate to the inferior T9 endplate, apex at T6.    Minor Curve(s): Levoconvex thoracolumbar curvature of approximately 3-4 degrees as measured from the superior T11 endplate to the inferior L4 endplate, apex at L1-L2.    Sagittal/Coronal Balance: A positive sagittal imbalance of approximately 3.3 cm. No significant coronal imbalance.             Medications   HYDROmorphone (DILAUDID) injection 1 mg (1 mg Intramuscular $Given 9/21/23 1435)   LORazepam (ATIVAN) injection 1 mg (1 mg Intramuscular $Given 9/21/23 1435)       Assessments & Plan     I have reviewed the findings, diagnosis, plan and need for follow up with the patient.    Discharge Medication List as of 9/21/2023  2:21 PM          Final diagnoses:   Acute right-sided low back pain with right-sided sciatica     Disposition: Patient discharged home in stable condition.  Plan as above.  Return for concerns.     Note: Chart documentation done in part with Dragon Voice Recognition software. Although reviewed after completion, some word and grammatical errors may remain.   9/21/2023   [facility] EMERGENCY DEPT       Shima Shin MD  09/22/23 [id number]

## 2023-09-22 NOTE — DISCHARGE INSTRUCTIONS
He was seen today for vision changes.  We did do significant scans that did not show any acute signs of a stroke.  We recommend he follow-up with an ophthalmologist.  If any new signs or concerns arise please return for evaluation.  Otherwise please follow-up with your primary care doctor next 2 to 3 days.

## 2023-09-22 NOTE — ED TRIAGE NOTES
Pt seen in ED last night - hx of chronic back issues. Has started taking prednisone and a muscle relaxer at 0600 - pt about an hour ago had a loss of vision in R eye completely and now is having a loss in peripheral vision R eye.     CODE STROKE CALLED 1054.      Triage Assessment       Row Name 09/22/23 1053       Triage Assessment (Adult)    Airway WDL WDL       Respiratory WDL    Respiratory WDL WDL       Cardiac WDL    Cardiac WDL WDL

## 2023-09-22 NOTE — ED PROVIDER NOTES
History     Chief Complaint   Patient presents with    Loss of Vision     HPI  Reji Choi is a 43 year old male who presents with intermittent central vision changes.  He notes this happened approximate hour and a half ago which were put symptom onset around 10 AM.  Explains that he was texting and noted some changes to the central area of his vision and then transferred to the right lateral aspect of his vision and then back to the central vision on arrival and examination patient symptoms have improved.  He has no numbness and tingling or extremity have changed.  He does have chronic back pain is seen yesterday for concern of disc herniation.  He has no new weakness that he is aware of no headaches no dizziness no confusion he does suffer from chronic hypertension and is mildly hypertensive on arrival.  He has no chest pain troubles breathing swallowing or any abdominal pain.  Denies any chronic substance abuse.    Allergies:  Allergies   Allergen Reactions    Ampicillin-Sulbactam Sodium Anaphylaxis    Clindamycin Other (See Comments)     Canker sores in mouth    Ketorolac Other (See Comments)     Severe agitation    Pravastatin Muscle Pain (Myalgia)     Liver stress    Rosuvastatin Muscle Pain (Myalgia)     Liver stress    Simvastatin Muscle Pain (Myalgia)     Liver stress       Problem List:    Patient Active Problem List    Diagnosis Date Noted    Controlled substance agreement signed 02/01/2018     Priority: Medium     for tramadol      Chronic neck pain 10/03/2017     Priority: Medium    Biliary dyskinesia 08/29/2017     Priority: Medium    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 No pertinent past medical history.    Past Surgical History:    Past Surgical History:   Procedure Laterality Date    CHOLECYSTECTOMY         Family History:    History reviewed. No pertinent family history.    Social History:  Marital Status:   [2]  Social History     Tobacco Use    Smoking status: Former    Smokeless tobacco: Never    Tobacco comments:     eight months ago   Substance Use Topics    Alcohol use: Yes     Comment: occ    Drug use: Yes     Types: Marijuana     Comment: prescription         Medications:    acetaminophen (TYLENOL) 500 MG tablet  colestipol (COLESTID) 1 g tablet  desonide (DESOWEN) 0.05 % external cream  escitalopram (LEXAPRO) 20 MG tablet  ezetimibe (ZETIA) 10 MG tablet  gabapentin (NEURONTIN) 100 MG capsule  ibuprofen (ADVIL/MOTRIN) 200 MG tablet  losartan (COZAAR) 100 MG tablet  methocarbamol (ROBAXIN) 750 MG tablet  metoprolol succinate ER (TOPROL-XL) 200 MG 24 hr tablet  omeprazole (PRILOSEC) 20 MG DR capsule  OXcarbazepine (TRILEPTAL) 150 MG tablet  predniSONE (DELTASONE) 20 MG tablet  risperiDONE (RISPERDAL) 0.5 MG tablet          Review of Systems   Eyes:  Positive for visual disturbance.   All other systems reviewed and are negative.      Physical Exam   BP: (!) 160/96  Pulse: 99  Temp: 98  F (36.7  C)  Resp: 18  Weight: 80.7 kg (178 lb)  SpO2: 99 %      Physical Exam  Vitals and nursing note reviewed.   Constitutional:       General: He is not in acute distress.     Appearance: Normal appearance. He is normal weight. He is not ill-appearing or toxic-appearing.   HENT:      Head: Normocephalic and atraumatic.   Eyes:      General: Visual field deficit (impriving central vision right sided vision.) present.   Cardiovascular:      Rate and Rhythm: Normal rate.      Pulses: Normal pulses.      Heart sounds:  Normal heart sounds.   Pulmonary:      Effort: Pulmonary effort is normal.      Breath sounds: Normal breath sounds.   Abdominal:      General: Abdomen is flat. Bowel sounds are normal.      Palpations: Abdomen is soft.   Musculoskeletal:         General: Normal range of motion.      Cervical back: Normal range of motion.   Skin:     General: Skin is warm and dry.      Capillary Refill: Capillary refill takes less than 2 seconds.      Findings: No bruising or lesion.   Neurological:      General: No focal deficit present.      Mental Status: He is alert and oriented to person, place, and time. Mental status is at baseline.      GCS: GCS eye subscore is 4. GCS verbal subscore is 5. GCS motor subscore is 6.      Sensory: Sensation is intact.      Motor: Motor function is intact.      Coordination: Coordination is intact.      Gait: Gait abnormal (chronic 2/2 to low back pain).   Psychiatric:         Mood and Affect: Mood normal.         Behavior: Behavior normal.         Thought Content: Thought content normal.         ED Course                 Procedures              EKG Interpretation:      Interpreted by Radha Bear MD  Time reviewed: 1150am  Symptoms at time of EKG: vision changes  Rhythm: normal sinus   Rate: normal  Axis: normal  Ectopy: none  Conduction: normal  ST Segments/ T Waves: No ST-T wave changes  Q Waves: none  Comparison to prior: Unchanged from 2/10/23    Clinical Impression: normal EKG      Critical Care time:  was 75 minutes for this patient excluding procedures.           Results for orders placed or performed during the hospital encounter of 09/22/23 (from the past 24 hour(s))   Head CT w/o contrast    Narrative    CT SCAN OF THE HEAD WITHOUT CONTRAST   9/22/2023 11:10 AM     HISTORY: right eye, central vision changes    TECHNIQUE:  Axial images of the head and coronal reformations without  IV contrast material. Radiation dose for this scan was reduced using  automated exposure control,  adjustment of the mA and/or kV according  to patient size, or iterative reconstruction technique.    COMPARISON: None.    FINDINGS: There is no evidence of intracranial hemorrhage, mass, acute  infarct or anomaly. The ventricles are normal in size, shape and  configuration. The brain parenchyma and subarachnoid spaces are  normal.     The visualized portions of the sinuses and mastoids appear normal. The  bony calvarium and bones of the skull base appear intact.       Impression    IMPRESSION:   No evidence of acute intracranial hemorrhage, mass, or  herniation.     [Case discussed with Dr. Bear at 11:12          BISHNU COSTA MD         SYSTEM ID:  [id number]   CBC with platelets differential    Narrative    The following orders were created for panel order CBC with platelets differential.  Procedure                               Abnormality         Status                     ---------                               -----------         ------                     CBC with platelets and d...[[id number]]  Abnormal            Final result                 Please view results for these tests on the individual orders.   Basic metabolic panel   Result Value Ref Range    Sodium 134 (L) 136 - 145 mmol/L    Potassium 4.8 3.4 - 5.3 mmol/L    Chloride 98 98 - 107 mmol/L    Carbon Dioxide (CO2) 22 22 - 29 mmol/L    Anion Gap 14 7 - 15 mmol/L    Urea Nitrogen 14.1 6.0 - 20.0 mg/dL    Creatinine 0.73 0.67 - 1.17 mg/dL    Calcium 9.5 8.6 - 10.0 mg/dL    Glucose 136 (H) 70 - 99 mg/dL    GFR Estimate >90 >60 mL/min/1.73m2   INR   Result Value Ref Range    INR 0.99 0.85 - 1.15   CBC with platelets and differential   Result Value Ref Range    WBC Count 22.5 (H) 4.0 - 11.0 10e3/uL    RBC Count 3.99 (L) 4.40 - 5.90 10e6/uL    Hemoglobin 12.5 (L) 13.3 - 17.7 g/dL    Hematocrit 35.7 (L) 40.0 - 53.0 %    MCV 90 78 - 100 fL    MCH 31.3 26.5 - 33.0 pg    MCHC 35.0 31.5 - 36.5 g/dL    RDW 12.9 10.0 - 15.0 %    Platelet Count 379 150 - 450  10e3/uL    % Neutrophils 93 %    % Lymphocytes 4 %    % Monocytes 2 %    % Eosinophils 0 %    % Basophils 0 %    % Immature Granulocytes 1 %    NRBCs per 100 WBC 0 <1 /100    Absolute Neutrophils 21.0 (H) 1.6 - 8.3 10e3/uL    Absolute Lymphocytes 0.9 0.8 - 5.3 10e3/uL    Absolute Monocytes 0.4 0.0 - 1.3 10e3/uL    Absolute Eosinophils 0.0 0.0 - 0.7 10e3/uL    Absolute Basophils 0.0 0.0 - 0.2 10e3/uL    Absolute Immature Granulocytes 0.2 <=0.4 10e3/uL    Absolute NRBCs 0.0 10e3/uL   CRP inflammation   Result Value Ref Range    CRP Inflammation <3.00 <5.00 mg/L   Procalcitonin   Result Value Ref Range    Procalcitonin 0.03 <0.05 ng/mL   Erythrocyte sedimentation rate auto   Result Value Ref Range    Erythrocyte Sedimentation Rate 6 0 - 15 mm/hr   CTA Head Neck with Contrast    Narrative    CT ANGIOGRAM OF THE HEAD AND NECK WITH CONTRAST  9/22/2023 11:41 AM     HISTORY: vision loss on right    TECHNIQUE:  CT angiography with an injection of Isovue-370, 80ml IV  with scans through the head and neck. Images were transferred to a  separate 3-D workstation where multiplanar reformations and 3-D images  were created. Estimates of carotid stenoses are made relative to the  distal internal carotid artery diameters except as noted. Radiation  dose for this scan was reduced using automated exposure control,  adjustment of the mA and/or kV according to patient size, or iterative  reconstruction technique.    COMPARISON: None.     CT HEAD FINDINGS: No contrast enhancing lesions. Cerebral blood flow  is grossly normal.     CT ANGIOGRAM HEAD FINDINGS:  The major intracranial arteries including  the proximal branches of the anterior cerebral, middle cerebral, and  posterior cerebral arteries appear patent without vascular cutoff. No  aneurysm identified. No significant stenosis. Venous circulation is  unremarkable. Hypoplastic left A1 segment. Anterior communicating  artery is unremarkable. Fetal-type right PCA.    CT ANGIOGRAM NECK  FINDINGS:   Normal origin of the great vessels from the aortic arch.     Right carotid artery: The right common and internal carotid arteries  are patent. No significant stenosis or atherosclerotic disease in the  carotid artery.     Left carotid artery: The left common and internal carotid arteries are  patent. No significant stenosis or atherosclerotic disease in the  carotid artery.     Vertebral arteries: Vertebral arteries are patent without evidence of  dissection. No significant stenosis.     Other findings: None.       Impression    IMPRESSION:    No vessel occlusion findings intracranially. No hemodynamically  significant stenosis in the neck.    BISHNU COSTA MD         SYSTEM ID:  [id number]   Urine Drugs of Abuse Screen    Narrative    The following orders were created for panel order Urine Drugs of Abuse Screen.  Procedure                               Abnormality         Status                     ---------                               -----------         ------                     Drug Abuse Screen Qual U...[[id number]]  Abnormal            Final result                 Please view results for these tests on the individual orders.   Drug Abuse Screen Qual Urine   Result Value Ref Range    Amphetamines Urine Screen Negative Screen Negative    Barbituates Urine Screen Negative Screen Negative    Benzodiazepine Urine Screen Negative Screen Negative    Cannabinoids Urine Screen Positive (A) Screen Negative    Cocaine Urine Screen Negative Screen Negative    Fentanyl Qual Urine Screen Negative Screen Negative    Opiates Urine Screen Negative Screen Negative    PCP Urine Screen Negative Screen Negative   UA with Microscopic reflex to Culture    Specimen: Urine, Midstream   Result Value Ref Range    Color Urine Yellow Colorless, Straw, Light Yellow, Yellow    Appearance Urine Clear Clear    Glucose Urine Negative Negative mg/dL    Bilirubin Urine Negative Negative    Ketones Urine Negative Negative mg/dL     Specific Gravity Urine 1.031 1.003 - 1.035    Blood Urine Negative Negative    pH Urine 6.0 5.0 - 7.0    Protein Albumin Urine Negative Negative mg/dL    Urobilinogen Urine Normal Normal, 2.0 mg/dL    Nitrite Urine Negative Negative    Leukocyte Esterase Urine Negative Negative    Mucus Urine Present (A) None Seen /LPF    RBC Urine 1 <=2 /HPF    WBC Urine <1 <=5 /HPF    Squamous Epithelials Urine <1 <=1 /HPF    Narrative    Urine Culture not indicated   MR Brain w/o Contrast    Narrative    MRI BRAIN WITHOUT CONTRAST  9/22/2023 4:00 PM    HISTORY: Sudden right eye central vision changes/    TECHNIQUE: Multiplanar, multisequence MRI of the brain without  gadolinium IV contrast material.      COMPARISON:  Head CT 9/22/2023    FINDINGS: No evidence of ischemia, hemorrhage, mass, or hydrocephalus.  The cerebral hemispheres, brainstem, and cerebellum demonstrate normal  morphology and signal for the patient's age. No abnormal diffusion  restriction.    Marrow signal is within normal limits. Trace paranasal sinus mucosal  thickening. The visualized tympanic and mastoid cavities are  unremarkable.      Impression    IMPRESSION:  Unremarkable MRI of the head.    LASHAUN DOVER MD         SYSTEM ID:  [id number]       Medications   iopamidol (ISOVUE-370) solution 500 mL (80 mLs Intravenous $Given 9/22/23 1130)   sodium chloride 0.9 % bag 100mL for CT scan flush use (100 mLs Intravenous $Given 9/22/23 1130)   HYDROmorphone (PF) (DILAUDID) injection 0.5 mg (0.5 mg Intravenous $Given 9/22/23 1248)   LORazepam (ATIVAN) injection 1 mg (1 mg Intravenous $Given 9/22/23 1246)   HYDROmorphone (PF) (DILAUDID) injection 0.5 mg (0.5 mg Intravenous $Given 9/22/23 1625)       Assessments & Plan (with Medical Decision Making)     I have reviewed the nursing notes.    I have reviewed the findings, diagnosis, plan and need for follow up with the patient.           Medical Decision Making  43-year-old male presenting with nonpainful  nontraumatic centralized intermittent vision loss.  At this time differential includes optic neuritis, temporal arteritis, acute angle glaucoma, endophthalmitis, uveitis, TIA, stroke.  Patient has no pain with eye movements and vision changes have been improving since onset of symptoms approximately 2 hours ago.  CT head and CT head with contrast and neck with contrast without signs of acute occlusion or dissection.  Adria case with neurology who agreed this is abnormal in regards to differential including scotoma, retinal detachment, TIA, stat central retinal vein occlusion/central retinal artery occlusion, meningitis/encephalitis.  EKG self interpreted without acute signs of atrial fibrillation and or ACS syndrome.  Patient denies any foreign body or trauma to the area or any recent procedures done therefore unlikely uveitis/endophthalmitis or corneal abrasion/injury.  No history of substance abuse or back injury with a recent MRI of his lower back completed without acute signs of abscess and no history of recent fevers or tachycardia.  Low concern for meningitis and/or encephalitis.  No significant history of temporal tenderness, history of stroke or complex migraines.  No history of glaucoma.  CT head and neck without acute signs of obstruction.  MRI ordered.  At this time will provide patient with 1 dose of Dilaudid and Ativan for pain and anxiety.  On reevaluation patient is has increasing anxiety and pain of his lower back however notes that his vision is completely resolved at this point.  On review his lab work, UA negative, UDS positive for cannabis, BMP with a mild hyponatremia of 134 however glucose elevated 136, significant elevated white cell count of 22.5 with hemoglobin 12.5 and a neutrophil count of 21.  ESR, CRP and procalcitonin within normal limits.  At this time elevated white cell count likely secondary to steroid use.  Patient did take his first dose of Medrol dose this morning.  He notes  approximately 2 hours after taking this medication this is when his symptoms initiated.  Rediscussed case with stroke neurology recommending MRI otherwise unremarkable can discharge outpatient with outpatient follow-up with neurology and ophthalmology.  On reevaluation patient's visual changes have subsided.  Pending MRI.    MRI negative for for acute intracranial pathology.  Discussed findings with patient and outpatient follow-up instructions.  Discussed case with neurology who noted that if MRI negative can follow-up question with ophthalmology and neurology.  Patient provided with 1 more dose of Dilaudid for his pain of his lower back he notes that this is significantly been helpful.  Patient discharged home.      Discharge Medication List as of 9/22/2023  4:44 PM          Final diagnoses:   Vision changes       9/22/2023   [facility] EMERGENCY DEPT       Radha Bear MD  09/22/23 2660

## 2023-09-25 ENCOUNTER — TELEPHONE (OUTPATIENT)
Dept: PALLIATIVE MEDICINE | Facility: CLINIC | Age: 43
End: 2023-09-25
Payer: COMMERCIAL

## 2023-09-25 DIAGNOSIS — M54.16 LUMBAR RADICULOPATHY: Primary | ICD-10-CM

## 2023-09-25 NOTE — TELEPHONE ENCOUNTER
"Screening Questions for Radiology Injections:    Injection to be done at which interventional clinic site? [facility] [place]    If choosing [facility] for location, please inform patient:  \"[facility] is a Hospital based clinic. Before your visit, you should check with your insurance about how it covers the charges for facility services in a hospital-based clinic.     Procedure ordered by Zachary    Procedure ordered? LESI  Transforaminal Cervical SAULO - Send to [facility] - No [facility] providers perform this procedure    What insurance would patient like us to bill for this procedure? HP  IF SCHEDULING IN [place] PAIN OR SPINE PLEASE SCHEDULE AT LEAST 7-10 BUSINESS DAYS OUT SO A PA CAN BE OBTAINED  Worker's comp or MVA (motor vehicle accident) -Any injection DO NOT SCHEDULE and route to Tayla Little.    [facility] [employer] insurance - For SI joint injections, DO NOT SCHEDULE and route to Janett Miller.     ALL BCBS, [employer] and [employer] - DO NOT SCHEDULE and route to Janett Miller  MEDICA- facet joint injections, route to Janett Miller    Is patient scheduled at [facility]? no   If YES, route every encounter to [facility] SPINE CENTER CARE NAVIGATION POOL [[phone]]    Is an  needed? No     Patient has a  home? (Review Grid) YES: ok    Any chance of pregnancy? NO   If YES, do NOT schedule and route to RN pool  - Dr. Curtis route to Winnie العلي and PM&R Nurse  [[id number]]      Is patient actively being treated for cancer or immunocompromised? No  If YES, do NOT schedule and route to RN pool/ Dr. Curtis's Team    Does the patient have a bleeding or clotting disorder? No   If YES, okay to schedule AND route to RN nurse / Dr. Curtis's Team   (For any patients with platelet count <100, RN must forward to provider)    Is patient taking any Blood Thinners OR Antiplatelet medication?  No   If hold needed, do NOT schedule, route to PETR arrington/ Dr. Curtis's " Team  Examples:   Blood Thinners: (Coumadin, Warfarin, Jantoven, Pradaxa, Xarelto, Eliquis, Edoxaban, Enoxaparin, Lovenox, Heparin, Arixtra, Fondaparinux or Fragmin)  Antiplatelet Medications: (Plavix, Brilinta or Effient)     Is patient taking any aspirin products (includes Excedrin and Fiorinal)? No   If more than 325mg/day, OK to schedule; Instruct Pt to decrease to less than 325 mg for 7 days AND route to RN pool/ Dr. Curtis's Team   For CERVICAL procedures, hold all aspirin products for 6 days.   Tell Pt that if aspirin product is not held for 6 days, the procedure WILL BE cancelled.     Any allergies to contrast dye, iodine, shellfish, or numbing and steroid medications? No  If YES, schedule and add allergy information to appointment notes AND route to the RN pool/ Dr. Curtis's Team  If SAULO and Contrast Dye / Iodine Allergy? DO NOT SCHEDULE, route to RN pool/ Dr. Curtis's Team  Allergies: Ampicillin-sulbactam sodium, Clindamycin, Ketorolac, Pravastatin, Rosuvastatin, and Simvastatin     Does patient have an active infection or treated for one within the past week? No  Is patient currently taking any antibiotics or steroid medications?  No   For patients on chronic, preventative, or prophylactic antibiotics, procedures may be scheduled.   For patients on antibiotics for active or recent infection, schedule 4 days after completed.  For patients on steroid medications, schedule 4 days after completed.     Has the patient had a flu shot or any other vaccinations within the past 7 days? No  If yes, explain that for the vaccine to work best they need to:     wait 1 week before and 1 week after getting any Vaccine  wait 1 week before and 2 weeks after getting Covid Vaccine #2 or BOOSTER  If patient has concerns about the timing, send to RN pool/ Dr. Curtis's Team    Does patient have an MRI/CT?  YES: MRI Include Date and Check Procedure Scheduling Grid to see if required.  Was the MRI/CT done within the last 3 years?   Yes   If no route to RN Pool/ Dr. Curtis's Team  If yes, where was the MRI/CT done? [facility]   Refer to PACS Transmissions list for approved external locations and route to RN Pool High Priority/ Dr. Hernandezs Team  If MRI was not done at approved external location do NOT schedule and route to RN pool/ Dr. Curtis's Team    If patient has an imaging disc, the injection MAY be scheduled but patient must bring disc to appt or appt will be cancelled.    Is patient able to transfer to a procedure table with minimal or no assistance? Yes   If no, do NOT schedule and route to RN Pool/ Dr. Curtis's Team    Procedure Specific Instructions:  If celiac plexus block, informed patient NPO for 6 hours and that it is okay to take medications with sips of water, especially blood pressure medications Not Applicable       If this is for a cervical procedure, informed patient that aspirin needs to be held for 6 days.   Not Applicable    Sedation, If Sedation is ordered for any procedure, patient must be NPO for 6 hours prior to procedure Not Applicable    If IV needed:  Do not schedule procedures requiring IV placement in the first appointment of the day or first appointment after lunch. Do NOT schedule at 0745, 0815 or 1245. ok  Instructed patient to arrive 30 minutes early for IV start if required. (Check Procedure Scheduling Grid)  Not Applicable    Reminders:  If you are started on any steroids or antibiotics between now and your appointment, you must contact us because the procedure may need to be cancelled.  Yes    As a reminder, receiving steroids can decrease your body's ability to fight infection.   Would you still like to move forward with scheduling the injection?  Yes    IV Sedation is not provided for procedures. If oral anti-anxiety medication is needed, the patient should request this from their referring provider.    Instruct patient to arrive as directed prior to the scheduled appointment time:  If IV needed 30  minutes before appointment time     For patients 85 or older we recommend having an adult stay w/ them for the remainder of the day.     If the patient is Diabetic, remind them to bring their glucometer.      Does the patient have any questions?  NO  Mindy Little  [facility] [place]

## 2023-09-29 ENCOUNTER — HOSPITAL ENCOUNTER (EMERGENCY)
Facility: CLINIC | Age: 43
Discharge: HOME | End: 2023-09-29
Attending: PHYSICIAN ASSISTANT | Admitting: PHYSICIAN ASSISTANT
Payer: COMMERCIAL

## 2023-09-29 VITALS
OXYGEN SATURATION: 98 % | HEART RATE: 72 BPM | RESPIRATION RATE: 16 BRPM | WEIGHT: 178 LBS | DIASTOLIC BLOOD PRESSURE: 78 MMHG | BODY MASS INDEX: 24.14 KG/M2 | SYSTOLIC BLOOD PRESSURE: 134 MMHG | TEMPERATURE: 98 F

## 2023-09-29 DIAGNOSIS — M54.41 ACUTE RIGHT-SIDED LOW BACK PAIN WITH RIGHT-SIDED SCIATICA: ICD-10-CM

## 2023-09-29 PROCEDURE — 96372 THER/PROPH/DIAG INJ SC/IM: CPT | Performed by: PHYSICIAN ASSISTANT

## 2023-09-29 PROCEDURE — 99284 EMERGENCY DEPT VISIT MOD MDM: CPT | Mod: 25 | Performed by: PHYSICIAN ASSISTANT

## 2023-09-29 PROCEDURE — 250N000011 HC RX IP 250 OP 636: Performed by: PHYSICIAN ASSISTANT

## 2023-09-29 PROCEDURE — 99284 EMERGENCY DEPT VISIT MOD MDM: CPT | Performed by: PHYSICIAN ASSISTANT

## 2023-09-29 RX ORDER — OXYCODONE HYDROCHLORIDE 5 MG/1
5 TABLET ORAL EVERY 6 HOURS PRN
Qty: 20 TABLET | Refills: 0 | Status: SHIPPED | OUTPATIENT
Start: 2023-09-29 | End: 2024-05-28

## 2023-09-29 RX ADMIN — HYDROMORPHONE HYDROCHLORIDE 1 MG: 1 INJECTION, SOLUTION INTRAMUSCULAR; INTRAVENOUS; SUBCUTANEOUS at 16:04

## 2023-09-29 NOTE — DISCHARGE INSTRUCTIONS
Restart the prednisone taper to help reduce the inflammation in your low back.    Continue with ibuprofen, Tylenol, Robaxin, ice/heat to your low back.  Reserve the use of the oxycodone prescribed for severe breakthrough pain.    Follow-up at your scheduled appointment on Thursday through [facility].    As discussed if you do have any worsening symptoms please return to an emergency department right away.    Thank you for choosing [facility]'s Emergency Department. It was a pleasure taking care of you today. If you have any questions, please call [phone].    Lottie Isaacs PA-C

## 2023-09-29 NOTE — ED PROVIDER NOTES
"  History     Chief Complaint   Patient presents with    Back Pain       HPI  Reji Choi is a 43 year old male who presents to the emergency department complaining of low back pain.  The patient has a history of chronic low back pain.  He had an ED visit on 9/14 with an MRI completed for back pain which showed disc extrusion at L5-S1.  He had been prescribed a Medrol Dosepak at that visit. He was seen in follow-up on 9/19 with a neurosurgical PA who recommended he be evaluated by a neurosurgeon due to the MRI findings.  Neurosurgeon saw him on 9/21 who advised physical therapy and an epidural injection.  His epidural injection is scheduled for October 12.  He also has an appointment coming up on October 5 with another neurosurgeon for second opinion.  He reports pain is getting no better despite his use of ibuprofen, Tylenol, Robaxin, ice, muscle rubs, stretching.  He describes it as a ripping pain down his right leg.  He is able to ambulate and reports the Robaxin helps with the tension and spasming in his right leg but otherwise he would have a lot more difficulty.  He notes intermittent tingling \"like ants crawling up and down my urethra\" in his penis.  He also has some tingling around his anus when the penile tingling occurs.  This started after his MRI was completed.  He denies any new injuries to the back.      Allergies:  Allergies   Allergen Reactions    Ampicillin-Sulbactam Sodium Anaphylaxis    Clindamycin Other (See Comments)     Canker sores in mouth    Ketorolac Other (See Comments)     Severe agitation    Pravastatin Muscle Pain (Myalgia)     Liver stress    Rosuvastatin Muscle Pain (Myalgia)     Liver stress    Simvastatin Muscle Pain (Myalgia)     Liver stress       Problem List:    Patient Active Problem List    Diagnosis Date Noted    Controlled substance agreement signed 02/01/2018     Priority: Medium     for tramadol      Chronic neck pain 10/03/2017     Priority: Medium    Biliary " dyskinesia 08/29/2017     Priority: Medium    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    Past Surgical History:    Past Surgical History:   Procedure Laterality Date    CHOLECYSTECTOMY         Family History:    No family history on file.    Social History:  Marital Status:   [2]  Social History     Tobacco Use    Smoking status: Former    Smokeless tobacco: Never    Tobacco comments:     eight months ago   Substance Use Topics    Alcohol use: Yes     Comment: occ    Drug use: Yes     Types: Marijuana     Comment: prescription         Medications:    oxyCODONE (ROXICODONE) 5 MG tablet  acetaminophen (TYLENOL) 500 MG tablet  colestipol (COLESTID) 1 g tablet  desonide (DESOWEN) 0.05 % external cream  escitalopram (LEXAPRO) 20 MG tablet  ezetimibe (ZETIA) 10 MG tablet  gabapentin (NEURONTIN) 100 MG capsule  ibuprofen (ADVIL/MOTRIN) 200 MG tablet  losartan (COZAAR) 100 MG tablet  methocarbamol (ROBAXIN) 750 MG tablet  metoprolol succinate ER (TOPROL-XL) 200 MG 24 hr tablet  omeprazole (PRILOSEC) 20 MG DR capsule  OXcarbazepine (TRILEPTAL) 150 MG tablet  predniSONE (DELTASONE) 20 MG tablet  risperiDONE (RISPERDAL) 0.5 MG tablet          Review of Systems   All other systems reviewed and are negative.        Physical Exam   BP: 128/89  Pulse: 73  Temp: 98  F (36.7  C)  Resp: 18  Weight: 80.7 kg (178 lb)  SpO2: 97 %      Physical Exam  Vitals and nursing note reviewed.   Constitutional:       General: He is not in  acute distress.     Appearance: Normal appearance. He is not ill-appearing, toxic-appearing or diaphoretic.   HENT:      Head: Normocephalic and atraumatic.      Nose: Nose normal.   Eyes:      Extraocular Movements: Extraocular movements intact.      Conjunctiva/sclera: Conjunctivae normal.   Pulmonary:      Effort: Pulmonary effort is normal. No respiratory distress.   Musculoskeletal:      Cervical back: Neck supple.      Comments: Tenderness to the low right lumbosacral spine region.   Skin:     General: Skin is warm and dry.   Neurological:      General: No focal deficit present.      Mental Status: He is alert.      Gait: Gait abnormal (favors right leg, but otherwise stable).      Comments: No strength deficits observed in bilateral lower extremities.   Psychiatric:         Mood and Affect: Mood normal.           ED Course          Procedures    No results found for this or any previous visit (from the past 24 hour(s)).    Medications   HYDROmorphone (DILAUDID) injection 1 mg (1 mg Intramuscular $Given 9/29/23 3354)         Assessments & Plan (with Medical Decision Making)  Reji Choi is a 43 year old male who presented to the ED complaining of ongoing low back pain.  He was seen in the ED on 9/14 with an MRI completed showing disc extrusion causing right S1 nerve root displacement and moderate spinal canal stenosis at L5-S1.  He is scheduled for epidural injection on 10/12 and has an appointment on 10/5 with the neurosurgeon at [facility] for second opinion.  Came in for uncontrollable symptoms at home.  On arrival to the ED had normal vital signs.  He had normal strength in lower extremities, guarded but stable gait.  Tenderness to the right low lumbosacral region.  He was given IM Dilaudid here for acute pain relief.  I spoke with neurosurgical JUDITH Warren regarding this patient's case.  He has having intermittent tingling in the genital region but no persistent saddle paresthesias, bowel or  bladder incontinence, or progressive weakness in extremities to suggest need for reimaging or emergent intervention at this time.  Patient had been told he could not be on steroids prior to epidural injection.  Neurosurgery approved restarting prednisone even with his epidural injection coming up since patient did note improvement with this medication, otherwise advised to continue with plan for injection in the next couple weeks.  I discussed these recommendations with the patient.  He was agreeable to restarting his prednisone and has left over at home to restart the taper.  I will also give him oxycodone for severe breakthrough pain and encouraged him to continue with over-the-counter pain medication, his muscle relaxer, ice, and stretching.  He does plan to keep the appointment on the fifth through [facility] which I think is reasonable for a second opinion. I did go over warning signs and symptoms of when to return to the ED.  All questions answered and patient discharged home in suitable condition.     I have reviewed the nursing notes.    I have reviewed the findings, diagnosis, plan and need for follow up with the patient.      New Prescriptions    OXYCODONE (ROXICODONE) 5 MG TABLET    Take 1 tablet (5 mg) by mouth every 6 hours as needed for pain       Final diagnoses:   Acute right-sided low back pain with right-sided sciatica     Note: Chart documentation done in part with Dragon Voice Recognition software. Although reviewed after completion, some word and grammatical errors may remain.     9/29/2023   [facility] EMERGENCY DEPT       Lottie Isaacs PA-C  09/29/23 0148

## 2023-10-03 ENCOUNTER — PRE VISIT (OUTPATIENT)
Dept: NEUROSURGERY | Facility: CLINIC | Age: 43
End: 2023-10-03
Payer: COMMERCIAL

## 2023-10-04 ENCOUNTER — THERAPY VISIT (OUTPATIENT)
Dept: PHYSICAL THERAPY | Facility: CLINIC | Age: 43
End: 2023-10-04
Attending: STUDENT IN AN ORGANIZED HEALTH CARE EDUCATION/TRAINING PROGRAM
Payer: COMMERCIAL

## 2023-10-04 DIAGNOSIS — M54.41 ACUTE RIGHT-SIDED LOW BACK PAIN WITH RIGHT-SIDED SCIATICA: ICD-10-CM

## 2023-10-04 PROCEDURE — 97010 HOT OR COLD PACKS THERAPY: CPT | Mod: GP | Performed by: PHYSICAL THERAPIST

## 2023-10-04 PROCEDURE — 97161 PT EVAL LOW COMPLEX 20 MIN: CPT | Mod: GP | Performed by: PHYSICAL THERAPIST

## 2023-10-04 PROCEDURE — 97110 THERAPEUTIC EXERCISES: CPT | Mod: GP | Performed by: PHYSICAL THERAPIST

## 2023-10-04 NOTE — PROGRESS NOTES
PHYSICAL THERAPY EVALUATION  Type of Visit: Evaluation    See electronic medical record for Abuse and Falls Screening details.    Subjective Pt has had low back pain for approximately 3 months with radiating symptoms into R LE.  Pt reports that his dog pulled his leash and pulled the patient down to the ground.  Pt reports a second incident falling into a hole a few weeks later.  Pt reports the last thing that made it much worse was swinging a sledge hammer.  Patient has had multiple ED visits as a result of this pain.  Pt reports pain is worst with sitting and lying down.  Pt reports frequent pacing and walking. Pt reports pain is mainly on R side of low back and radiates all the way down to his R foot.  Pt notes mm tension in R buttock and is sometimes the most painful spot.  Pt reports occasional pain on L buttock but rare and not as painful. Patient has taken mm relaxers, oxycodone, and steroid medications with minimal effect.        Presenting condition or subjective complaint: Herniated discs L4 and L5  Date of onset: 06/01/23    Relevant medical history: Fibromyalgia; High blood pressure; Pain at night or rest; Sleep disorder like apnea; Smoking   Dates & types of surgery: Gall bladder 2017    Prior diagnostic imaging/testing results: MRI; X-ray     Prior therapy history for the same diagnosis, illness or injury: No        Living Environment  Social support: With a significant other or spouse   Type of home: House; Multi-level   Stairs to enter the home: Yes 3 Is there a railing: Yes   Ramp: Yes   Stairs inside the home: Yes 15 Is there a railing: Yes   Help at home: Home and Yard maintenance tasks; Home management tasks (cooking, cleaning)  Equipment owned:       Employment: Yes IT  Hobbies/Interests: restoring cars/motorcycles, dogs, home restoration.    Patient goals for therapy: Everything    Pain assessment: Pain present     Objective   LUMBAR SPINE EVALUATION  PAIN: Pain Level at Rest: 5/10  Pain Level  with Use: 10/10  Pain Location: lumbar spine and hip  Pain Quality: Aching, Burning, and Throbbing  Pain Frequency: constant  Pain is Worst: daytime  Pain is Exacerbated By: sitting, bending.  Pain is Relieved By: otc medications  Pain Progression: Unchanged  INTEGUMENTARY (edema, incisions): WNL  POSTURE: WNL  GAIT:   Weightbearing Status: WBAT  Assistive Device(s): None  Gait Deviations: WNL  BALANCE/PROPRIOCEPTION: WNL  WEIGHTBEARING ALIGNMENT: WNL  NON-WEIGHTBEARING ALIGNMENT: WNL   ROM:   (Degrees) Left AROM Left PROM  Right AROM Right PROM   Hip Flexion       Hip Extension       Hip Abduction       Hip Adduction       Hip Internal Rotation       Hip External Rotation       Knee Flexion       Knee Extension       Lumbar Side glide     Lumbar Flexion    Lumbar Extension    Pain:   End feel:   PELVIC/SI SCREEN:  NA  STRENGTH: WNL  MYOTOMES: WNL  DTR S: WNL  CORD SIGNS: WNL  DERMATOMES: R buttock through posterior thigh wrapping around to foot.  NEURAL TENSION:  Slump positive on R.  FLEXIBILITY:  Limited d/t neurotension.  LUMBAR/HIP Special Tests: WNL   PELVIS/SI SPECIAL TESTS:  NA  FUNCTIONAL TESTS:  NA  PALPATION:  Tender along R lumbar paraspinals.  SPINAL SEGMENTAL CONCLUSIONS:  Possible L4-5 and L5-S1 involvement based on dermatomes and MRI findings.    Assessment & Plan   CLINICAL IMPRESSIONS  Medical Diagnosis: Acute right sided low back pain with right sided sciatica.    Treatment Diagnosis: Acute right sided low back pain with right sided sciatica.   Impression/Assessment: Patient is a 43 year old male with low back pain and R LE radiating pain complaints.  The following significant findings have been identified: Pain, Decreased ROM/flexibility, Decreased joint mobility, and Decreased strength. These impairments interfere with their ability to perform work tasks, recreational activities, household chores, household mobility, and community mobility as compared to previous level of function.     Clinical  Decision Making (Complexity):  Clinical Presentation: Stable/Uncomplicated  Clinical Presentation Rationale: based on medical and personal factors listed in PT evaluation  Clinical Decision Making (Complexity): Low complexity    PLAN OF CARE  Treatment Interventions:  Modalities: Cryotherapy, Hot Pack  Interventions: Gait Training, Manual Therapy, Neuromuscular Re-education, Therapeutic Activity, Therapeutic Exercise    Long Term Goals     PT Goal 1  Goal Identifier: HEP  Goal Description: Pt will be independent with HEP in order to centralize symptoms to low back with an intensity of no greater than 2/10 in order to improve tolerance to work duties and recreational activities.  Target Date: 11/15/23  PT Goal 2  Goal Identifier: Return to activity  Goal Description: Pt will demonstrate no increase in pain with perform bending motions in order to perform tasks such as house and car maintenance.  Target Date: 12/15/23      Frequency of Treatment: 1x/week  Duration of Treatment: 10 weeks    Recommended Referrals to Other Professionals: None.  Education Assessment:   Learner/Method: Patient;Pictures/Video;No Barriers to Learning;Demonstration  Education Comments: HEP    Risks and benefits of evaluation/treatment have been explained.   Patient/Family/caregiver agrees with Plan of Care.     Evaluation Time:     PT Eval, Low Complexity Minutes (78691): 15     Signing Clinician: Brooks Hooks PT

## 2023-10-11 ENCOUNTER — TELEPHONE (OUTPATIENT)
Dept: PALLIATIVE MEDICINE | Facility: OTHER | Age: 43
End: 2023-10-11
Payer: COMMERCIAL

## 2023-10-12 ENCOUNTER — RADIOLOGY INJECTION OFFICE VISIT (OUTPATIENT)
Dept: PALLIATIVE MEDICINE | Facility: OTHER | Age: 43
End: 2023-10-12
Attending: STUDENT IN AN ORGANIZED HEALTH CARE EDUCATION/TRAINING PROGRAM
Payer: COMMERCIAL

## 2023-10-12 VITALS — DIASTOLIC BLOOD PRESSURE: 82 MMHG | OXYGEN SATURATION: 99 % | SYSTOLIC BLOOD PRESSURE: 143 MMHG | HEART RATE: 62 BPM

## 2023-10-12 DIAGNOSIS — M54.16 LUMBAR RADICULOPATHY: Primary | ICD-10-CM

## 2023-10-12 DIAGNOSIS — M54.41 ACUTE RIGHT-SIDED LOW BACK PAIN WITH RIGHT-SIDED SCIATICA: ICD-10-CM

## 2023-10-12 PROCEDURE — 255N000002 HC RX 255 OP 636: Performed by: STUDENT IN AN ORGANIZED HEALTH CARE EDUCATION/TRAINING PROGRAM

## 2023-10-12 PROCEDURE — 62323 NJX INTERLAMINAR LMBR/SAC: CPT | Performed by: STUDENT IN AN ORGANIZED HEALTH CARE EDUCATION/TRAINING PROGRAM

## 2023-10-12 PROCEDURE — 250N000009 HC RX 250: Performed by: STUDENT IN AN ORGANIZED HEALTH CARE EDUCATION/TRAINING PROGRAM

## 2023-10-12 PROCEDURE — 250N000011 HC RX IP 250 OP 636: Mod: JZ | Performed by: STUDENT IN AN ORGANIZED HEALTH CARE EDUCATION/TRAINING PROGRAM

## 2023-10-12 RX ORDER — LIDOCAINE HYDROCHLORIDE 10 MG/ML
1 INJECTION, SOLUTION EPIDURAL; INFILTRATION; INTRACAUDAL; PERINEURAL ONCE
Status: COMPLETED | OUTPATIENT
Start: 2023-10-12 | End: 2023-10-12

## 2023-10-12 RX ORDER — METHYLPREDNISOLONE ACETATE 40 MG/ML
40 INJECTION, SUSPENSION INTRA-ARTICULAR; INTRALESIONAL; INTRAMUSCULAR; SOFT TISSUE ONCE
Status: COMPLETED | OUTPATIENT
Start: 2023-10-12 | End: 2023-10-12

## 2023-10-12 RX ADMIN — LIDOCAINE HYDROCHLORIDE 1 ML: 10 INJECTION, SOLUTION EPIDURAL; INFILTRATION; INTRACAUDAL; PERINEURAL at 09:19

## 2023-10-12 RX ADMIN — IOHEXOL 10 ML: 180 INJECTION INTRAVENOUS at 09:27

## 2023-10-12 RX ADMIN — METHYLPREDNISOLONE ACETATE 40 MG: 40 INJECTION, SUSPENSION INTRA-ARTICULAR; INTRALESIONAL; INTRAMUSCULAR; INTRASYNOVIAL; SOFT TISSUE at 09:19

## 2023-10-12 ASSESSMENT — PAIN SCALES - GENERAL
PAINLEVEL: EXTREME PAIN (8)
PAINLEVEL: SEVERE PAIN (7)

## 2023-10-12 NOTE — PROGRESS NOTES
Do you have any allergies to contrast dye, iodine, steroid and/or numbing medications?  NO    Are you currently taking antibiotics or have an active infection?  NO    Have you had a fever/elevated temperature within the past week? NO    Are you currently taking oral steroids? NO    Do you have a ? Yes    Are you pregnant or breastfeeding?  Not Applicable    Have you received the COVID-19 vaccine? No no  If yes, was it your 1st, 2nd or only dose needed? None received  Date of most recent vaccine: 0    Notify provider and RNs if systolic BP >170, diastolic BP >100, P >100 or O2 sats < 90%

## 2023-10-12 NOTE — PATIENT INSTRUCTIONS
[facility] [place]   Procedure Discharge Instructions    Today you saw:  Dr. Chavez    You had a:  Lumbar epidural steroid injection       Medications used:  Lidocaine  Methylprednisolone  Omnipaque   Normal saline        If you were holding your blood thinning medication, please restart taking it: N/A  Be cautious when walking. Numbness and/or weakness in the lower extremities may occur for up to 6-8 hours after the procedure due to effect of the local anesthetic  Do not drive for 6 hours. The effect of the local anesthetic could slow your reflexes.   You may resume your regular activities after 24 hours  Avoid strenuous activity for the first 24 hours  You may shower, however avoid swimming, tub baths or hot tubs for 24 hours following your procedure  You may have a mild to moderate increase in pain for several days following the injection.  It may take up to 14 days for the steroid medication to start working although you may feel the effect as early as a few days after the procedure.     You may use ice packs for 10-15 minutes, 3 to 4 times a day at the injection site for comfort  Do not use heat to painful areas for 6 to 8 hours. This will give the local anesthetic time to wear off and prevent you from accidentally burning your skin.   Unless you have been directed to avoid the use of anti-inflammatory medications (NSAIDS), you may use medications such as ibuprofen, Aleve or Tylenol for pain control if needed.   If you were fasting, you may resume your normal diet and medications after the procedure  If you have diabetes, check your blood sugar more frequently than usual as your blood sugar may be higher than normal for 10-14 days following a steroid injection. Contact your doctor who manages your diabetes if your blood sugar is higher than usual  Possible side effects of steroids that you may experience include flushing, elevated blood pressure, increased appetite, mild  headaches and restlessness.  All of these symptoms will get better with time.  If you experience any of the following, call the Pain Clinic at [phone]:  -Fever over 100 degree F  -Swelling, bleeding, redness, drainage, warmth at the injection site  -Progressive weakness or numbness in your legs or arms  -Loss of bowel or bladder function  -Unusual headache that is not relieved by Tylenol or other pain reliever  -Unusual new onset of pain that is not improving

## 2023-10-12 NOTE — PROGRESS NOTES
Pre procedure Diagnosis: lumbar radiculopathy   Post procedure Diagnosis: Same  Procedure performed: L5-S1 interlaminar epidural steroid injection, CPT code 14110  Anesthesia: none  Complications: none  Operators: Guerrero Chavez MD    Indications:   Reji Choi is a 43 year old male was sent by Dr. gallo for a lumbar epidural steroid injection. During the consent process, this procedure was determined to be an appropriate intervention for the patient's symptoms.    Options/alternatives, benefits and risks were discussed with the patient including bleeding, infection, no pain relief, tissue trauma, exposure to radiation, reaction to medications, spinal cord injury, dural puncture, weakness, numbness and headache.  Questions were answered to his satisfaction and he agrees to proceed. Voluntary informed consent was obtained and signed.     Vitals were reviewed: Yes  Allergies were reviewed:  Yes   Medications were reviewed:  Yes   Pre-procedure pain score: 7/10    Procedure:  After getting informed consent, patient was brought into the procedure suite and was placed in a prone position on the procedure table.   A Pause for the Cause was performed.  Patient was prepped and draped in sterile fashion.     The L5-S1 interspace was identified with AP fluoroscopy.  A total of 3ml of 1% lidocaine was used to anesthetize the skin for a right paramedian approach.    A 20gauge 3.5inch Touhy needle was advanced under intermittent fluoroscopy.  A KATHI syringe was used to advance the needle into the epidural space.   After loss of resistance, there was no evidence of blood or CSF on aspiration. Location was verified with both AP and either contralateral oblique or lateral fluoroscopic imaging.    A total of 1ml of Omnipaque 180 was injected demonstrating appropriate epidural spread and was checked in both the AP and either contralateral oblique or lateral views to establish multiplanar confirmation. There was no evidence of  intravascular or intrathecal spread.    1 ml of 1% preservative free lidocaine with 40mg of methylprednisolone and 2ml of preservative free saline was injected.  The needle was removed from the epidural space, flushed with 1% lidocaine and removed.     Hemostasis was achieved, the area was cleaned, and bandaids were placed when appropriate.  The patient tolerated the procedure well, and was taken to the recovery room.    Images were saved to PACS.    Post-procedure pain score: 8/10  Follow-up includes:   -f/u with referring provider    Guerrero Chavez MD  Interventional Pain Medicine  [facility]

## 2023-10-19 ENCOUNTER — THERAPY VISIT (OUTPATIENT)
Dept: PHYSICAL THERAPY | Facility: CLINIC | Age: 43
End: 2023-10-19
Attending: STUDENT IN AN ORGANIZED HEALTH CARE EDUCATION/TRAINING PROGRAM
Payer: COMMERCIAL

## 2023-10-19 DIAGNOSIS — M54.41 ACUTE RIGHT-SIDED LOW BACK PAIN WITH RIGHT-SIDED SCIATICA: Primary | ICD-10-CM

## 2023-10-19 PROCEDURE — 97112 NEUROMUSCULAR REEDUCATION: CPT | Mod: GP | Performed by: PHYSICAL THERAPIST

## 2023-10-19 PROCEDURE — 97110 THERAPEUTIC EXERCISES: CPT | Mod: GP | Performed by: PHYSICAL THERAPIST

## 2023-10-19 PROCEDURE — 97530 THERAPEUTIC ACTIVITIES: CPT | Mod: GP | Performed by: PHYSICAL THERAPIST

## 2023-12-06 NOTE — PROGRESS NOTES
DISCHARGE  Reason for Discharge: Patient chooses to discontinue therapy.  Patient has failed to schedule further appointments.  Pt was a no show for 10/26/23 visit. PT has not been notified of any problems or questions since last visit.    Equipment Issued: none    Discharge Plan: Patient to continue home program.    Referring Provider:  Jae Sharma       10/19/23 0500   Appointment Info   Signing clinician's name / credentials Rm Bowen, PT   Visits Used 2   Medical Diagnosis Acute right sided low back pain with right sided sciatica.   PT Tx Diagnosis Acute right sided low back pain with right sided sciatica.   Progress Note/Certification   Onset of illness/injury or Date of Surgery 06/01/23   Therapy Frequency 1x/week   Predicted Duration 10 weeks   GOALS   PT Goals 2   PT Goal 1   Goal Identifier HEP   Goal Description Pt will be independent with HEP in order to centralize symptoms to low back with an intensity of no greater than 2/10 in order to improve tolerance to work duties and recreational activities.   Goal Progress pt noted good understanding of HEP at 10/19/23 visit   Target Date 11/15/23   PT Goal 2   Goal Identifier Return to activity   Goal Description Pt will demonstrate no increase in pain with perform bending motions in order to perform tasks such as house and car maintenance.   Goal Progress not met at 10/19/23 visit   Target Date 12/15/23   Subjective Report   Subjective Report May be about 30% better since injection 10/12/23. In standing at a 5/10 on average, was a 7-8/10.   Treatment Interventions (PT)   Interventions Therapeutic Procedure/Exercise;Therapeutic Activity;Neuromuscular Re-education   Therapeutic Procedure/Exercise   Therapeutic Procedures: strength, endurance, ROM, flexibillity minutes (83619) 15   Ther Proc 1 - Details Prone lying centralized LBP to golf ball size, sx still into R thigh/knee and into R foot 2/10. Tried 1 pillow under pelvis and LBP size increased, no  "change in LE sx. Removed pillow and 1 pillow under chest was best pain relieving exercise position (PREP) today. Pt to do x 5-15' every 2 hours. Pt is more of an extension responder   Skilled Intervention Directional preference exercise instruction.   Patient Response/Progress centralizaed LBP, reports understanding   Therapeutic Activity   Therapeutic Activities: dynamic activities to improve functional performance minutes (72889) 10   Ther Act 1 - Details pt educated in proper body mechanics with sit to stand, instructed in prone to sidelying to sit transfer keeping neutral spine, also keeping spine stable if has to cough or sneeze. Instructed in healing mechanics using \"cut knuckle\" analogy so pt should limit bending and twisting for now.   Skilled Intervention instruction   Patient Response/Progress reports understanding   Neuromuscular Re-education   Neuromuscular re-ed of mvmt, balance, coord, kinesthetic sense, posture, proprioception minutes (47534) 15   Neuro Re-ed 1 - Details in standing 7/10 R LBP, R LE to knee 5/10 to R foot 2/10, L thigh sx to above knee 2/10. Pt educated in centralization guidelines. Re ed with VCs and demonstration cues for neutral sitting posture with lumbar roll and homemade towel lumbar roll and able to decrease and centralize sx. Re ed for neutral standing and neutral supine or sidelying sleeping postures to keep sx more centralized.   Skilled Intervention instruction/assessment   Patient Response/Progress reports understanding   Education   Learner/Method Patient;Pictures/Video;No Barriers to Learning;Demonstration   Education Comments HEP   Plan   Home program Prone on elbows 5-6 times per day for 10-15 minutes.   Plan for next session review HEP, back education strategies   Comments   Comments able to decrease sx to 2/10 with PREP   Total Session Time   Timed Code Treatment Minutes 40   Total Treatment Time (sum of timed and untimed services) 40       "

## 2024-05-28 ENCOUNTER — APPOINTMENT (OUTPATIENT)
Dept: GENERAL RADIOLOGY | Facility: CLINIC | Age: 44
End: 2024-05-28
Attending: EMERGENCY MEDICINE
Payer: COMMERCIAL

## 2024-05-28 ENCOUNTER — HOSPITAL ENCOUNTER (EMERGENCY)
Facility: CLINIC | Age: 44
Discharge: HOME | End: 2024-05-28
Attending: EMERGENCY MEDICINE | Admitting: EMERGENCY MEDICINE
Payer: COMMERCIAL

## 2024-05-28 VITALS
OXYGEN SATURATION: 98 % | RESPIRATION RATE: 16 BRPM | HEART RATE: 65 BPM | TEMPERATURE: 98.4 F | DIASTOLIC BLOOD PRESSURE: 92 MMHG | SYSTOLIC BLOOD PRESSURE: 155 MMHG

## 2024-05-28 DIAGNOSIS — M54.41 ACUTE RIGHT-SIDED LOW BACK PAIN WITH RIGHT-SIDED SCIATICA: ICD-10-CM

## 2024-05-28 PROCEDURE — 99284 EMERGENCY DEPT VISIT MOD MDM: CPT | Performed by: EMERGENCY MEDICINE

## 2024-05-28 PROCEDURE — 72170 X-RAY EXAM OF PELVIS: CPT

## 2024-05-28 PROCEDURE — 96375 TX/PRO/DX INJ NEW DRUG ADDON: CPT | Performed by: EMERGENCY MEDICINE

## 2024-05-28 PROCEDURE — 99284 EMERGENCY DEPT VISIT MOD MDM: CPT | Mod: 25 | Performed by: EMERGENCY MEDICINE

## 2024-05-28 PROCEDURE — 96374 THER/PROPH/DIAG INJ IV PUSH: CPT | Performed by: EMERGENCY MEDICINE

## 2024-05-28 PROCEDURE — 250N000011 HC RX IP 250 OP 636: Performed by: EMERGENCY MEDICINE

## 2024-05-28 RX ORDER — CYCLOBENZAPRINE HCL 10 MG
10 TABLET ORAL 3 TIMES DAILY PRN
Qty: 20 TABLET | Refills: 0 | Status: SHIPPED | OUTPATIENT
Start: 2024-05-28 | End: 2024-06-03

## 2024-05-28 RX ORDER — METHYLPREDNISOLONE 4 MG
TABLET, DOSE PACK ORAL
Qty: 21 TABLET | Refills: 0 | Status: SHIPPED | OUTPATIENT
Start: 2024-05-28

## 2024-05-28 RX ORDER — LORAZEPAM 2 MG/ML
1 INJECTION INTRAMUSCULAR ONCE
Status: COMPLETED | OUTPATIENT
Start: 2024-05-28 | End: 2024-05-28

## 2024-05-28 RX ORDER — HYDROMORPHONE HYDROCHLORIDE 1 MG/ML
0.5 INJECTION, SOLUTION INTRAMUSCULAR; INTRAVENOUS; SUBCUTANEOUS EVERY 30 MIN PRN
Status: DISCONTINUED | OUTPATIENT
Start: 2024-05-28 | End: 2024-05-28 | Stop reason: HOSPADM

## 2024-05-28 RX ORDER — DEXAMETHASONE SODIUM PHOSPHATE 10 MG/ML
10 INJECTION, SOLUTION INTRAMUSCULAR; INTRAVENOUS ONCE
Status: COMPLETED | OUTPATIENT
Start: 2024-05-28 | End: 2024-05-28

## 2024-05-28 RX ORDER — OXYCODONE HYDROCHLORIDE 5 MG/1
5 TABLET ORAL EVERY 6 HOURS PRN
Qty: 12 TABLET | Refills: 0 | Status: SHIPPED | OUTPATIENT
Start: 2024-05-28 | End: 2024-05-31

## 2024-05-28 RX ADMIN — HYDROMORPHONE HYDROCHLORIDE 0.5 MG: 1 INJECTION, SOLUTION INTRAMUSCULAR; INTRAVENOUS; SUBCUTANEOUS at 17:22

## 2024-05-28 RX ADMIN — LORAZEPAM 1 MG: 2 INJECTION INTRAMUSCULAR; INTRAVENOUS at 16:58

## 2024-05-28 RX ADMIN — DEXAMETHASONE SODIUM PHOSPHATE 10 MG: 10 INJECTION, SOLUTION INTRAMUSCULAR; INTRAVENOUS at 17:02

## 2024-05-28 ASSESSMENT — COLUMBIA-SUICIDE SEVERITY RATING SCALE - C-SSRS
2. HAVE YOU ACTUALLY HAD ANY THOUGHTS OF KILLING YOURSELF IN THE PAST MONTH?: NO
6. HAVE YOU EVER DONE ANYTHING, STARTED TO DO ANYTHING, OR PREPARED TO DO ANYTHING TO END YOUR LIFE?: NO
1. IN THE PAST MONTH, HAVE YOU WISHED YOU WERE DEAD OR WISHED YOU COULD GO TO SLEEP AND NOT WAKE UP?: NO

## 2024-05-28 ASSESSMENT — ACTIVITIES OF DAILY LIVING (ADL)
ADLS_ACUITY_SCORE: 33
ADLS_ACUITY_SCORE: 37

## 2024-05-28 NOTE — DISCHARGE INSTRUCTIONS
Return to the emergency department if you develop new or worsening symptoms.  You may benefit from an SI joint injection.  I will leave that up to you and your spine surgeon.  They may want to repeat the MRI.  Your x-ray looks good here today.  Most likely this is sciatica.  I am glad you are feeling better.  Do not drive if taking the muscle relaxers and pain pills.  I sent a prescription for the Medrol Dosepak to her pharmacy as well.  I hope you get better quickly

## 2024-05-28 NOTE — ED PROVIDER NOTES
History     Chief Complaint   Patient presents with    Back Pain     HPI  Reji Choi is a 44 year old male who presents to the emergency department secondary to back pain.  Pain started Friday no known injury.  Patient has had surgery 5 months ago for L5-S1 disc disease in [place].  Pain is made much worse by change in position especially straight leg raise.  It also hurts to sit.  Pain starts at the right buttocks and radiates down the back of the leg to the ankle area but not to the bottom or top of the foot.  Its mostly in the back of the leg.  It is made better by hip external rotation and flexion.  No fever.  Pain started worsening the last couple of days.  It has been quite severe.  he called the surgeon and was prescribed a Medrol Dosepak however the pharmacy did not have it in [facility].    Allergies:  Allergies   Allergen Reactions    Ampicillin-Sulbactam Sodium Anaphylaxis    Clindamycin Other (See Comments)     Canker sores in mouth    Ketorolac Other (See Comments)     Severe agitation    Pravastatin Muscle Pain (Myalgia)     Liver stress    Rosuvastatin Muscle Pain (Myalgia)     Liver stress    Simvastatin Muscle Pain (Myalgia)     Liver stress       Problem List:    Patient Active Problem List    Diagnosis Date Noted    Acute right-sided low back pain with right-sided sciatica 10/04/2023     Priority: Medium    Controlled substance agreement signed 02/01/2018     Priority: Medium     for tramadol      Chronic neck pain 10/03/2017     Priority: Medium    Biliary dyskinesia 08/29/2017     Priority: Medium    Flying phobia 11/07/2016     Priority: Medium    Gastroesophageal reflux disease without esophagitis 07/03/2015     Priority: Medium    Allergic rhinitis 03/09/2015     Priority: Medium    Insomnia 02/03/2015     Priority: Medium    Fibromyalgia 01/02/2014     Priority: Medium    Hypertriglyceridemia 01/02/2014     Priority: Medium    Hypertension 01/02/2014     Priority: Medium     Hypovitaminosis D 01/02/2014     Priority: Medium    TRISHA on CPAP 01/02/2014     Priority: Medium        Past Medical History:    No past medical history on file.    Past Surgical History:    Past Surgical History:   Procedure Laterality Date    CHOLECYSTECTOMY         Family History:    No family history on file.    Social History:  Marital Status:   [2]  Social History     Tobacco Use    Smoking status: Former    Smokeless tobacco: Never    Tobacco comments:     eight months ago   Substance Use Topics    Alcohol use: Yes     Comment: occ    Drug use: Yes     Types: Marijuana     Comment: prescription         Medications:    cyclobenzaprine (FLEXERIL) 10 MG tablet  methylPREDNISolone (MEDROL DOSEPAK) 4 MG tablet therapy pack  oxyCODONE (ROXICODONE) 5 MG tablet  acetaminophen (TYLENOL) 500 MG tablet  colestipol (COLESTID) 1 g tablet  desonide (DESOWEN) 0.05 % external cream  escitalopram (LEXAPRO) 20 MG tablet  ezetimibe (ZETIA) 10 MG tablet  gabapentin (NEURONTIN) 100 MG capsule  ibuprofen (ADVIL/MOTRIN) 200 MG tablet  losartan (COZAAR) 100 MG tablet  methocarbamol (ROBAXIN) 750 MG tablet  metoprolol succinate ER (TOPROL-XL) 200 MG 24 hr tablet  omeprazole (PRILOSEC) 20 MG DR capsule  OXcarbazepine (TRILEPTAL) 150 MG tablet  predniSONE (DELTASONE) 20 MG tablet  risperiDONE (RISPERDAL) 0.5 MG tablet          Review of Systems   All other systems reviewed and are negative.      Physical Exam   BP: (!) 149/123  Pulse: 83  Temp: 98.3  F (36.8  C)  Resp: 16  SpO2: 100 %      Physical Exam  Vitals and nursing note reviewed.   Constitutional:       General: He is not in acute distress.     Appearance: He is well-developed. He is not diaphoretic.   HENT:      Head: Normocephalic and atraumatic.      Right Ear: External ear normal.      Left Ear: External ear normal.      Nose: Nose normal. No congestion.      Mouth/Throat:      Mouth: Mucous membranes are moist.      Pharynx: Oropharynx is clear.   Eyes:       General: No scleral icterus.     Extraocular Movements: Extraocular movements intact.      Conjunctiva/sclera: Conjunctivae normal.      Pupils: Pupils are equal, round, and reactive to light.   Cardiovascular:      Rate and Rhythm: Normal rate and regular rhythm.   Pulmonary:      Effort: Pulmonary effort is normal.      Breath sounds: Normal breath sounds.   Musculoskeletal:         General: No tenderness or deformity. Normal range of motion.      Cervical back: Normal range of motion and neck supple.      Comments: Pain much worse with straight leg raise.  It is improved with external rotation and flexion of the hip.   Lymphadenopathy:      Cervical: No cervical adenopathy.   Skin:     General: Skin is warm and dry.      Capillary Refill: Capillary refill takes less than 2 seconds.      Findings: No rash.   Neurological:      General: No focal deficit present.      Mental Status: He is alert and oriented to person, place, and time.      Cranial Nerves: No cranial nerve deficit.      Sensory: No sensory deficit.      Coordination: Coordination normal.   Psychiatric:         Mood and Affect: Mood normal.         Behavior: Behavior normal.         ED Course        Procedures                Results for orders placed or performed during the hospital encounter of 05/28/24 (from the past 24 hour(s))   XR Pelvis 1/2 Views    Narrative    EXAM: XR PELVIS 1/2 VIEWS  LOCATION: [facility]  DATE: 5/28/2024    INDICATION: right si joint pain  COMPARISON: None.      Impression    IMPRESSION: No acute fracture or malalignment. No sacroiliac joint erosions or ankylosis. No significant joint space narrowing. Mild marginal bony spurring of the hip joints bilaterally.       Medications   HYDROmorphone (PF) (DILAUDID) injection 0.5 mg (0.5 mg Intravenous $Given 5/28/24 9490)   LORazepam (ATIVAN) injection 1 mg (1 mg Intravenous $Given 5/28/24 1745)   dexAMETHasone PF (DECADRON) injection 10 mg (10 mg  Intravenous $Given 5/28/24 1707)       Assessments & Plan (with Medical Decision Making)  44-year-old with right-sided sciatica.  Pain is severe.  An IV was established, IV Dilaudid Ativan and Decadron given.  Patient will have follow-up with his spine surgeon.  I suggested that he may need an SI joint injection.  X-ray reviewed, no acute findings.  There is no narrowing of the SI joint.  This may be related to impingement at the SI joint or problems at the L5-S1.  He has neurosurgery/spine surgeon to follow-up with.  I have asked him to do that.  Patient had good improvement with the medications given here today.  There is no indication for emergency MRI.  Patient was sent home with prescriptions for oxycodone, Flexeril, Medrol Dosepak.  The diagnosis, treatment options, risks and follow-up discussed with a competent patient who agrees with the plan.  All questions answered prior to discharge.       I have reviewed the nursing notes.    I have reviewed the findings, diagnosis, plan and need for follow up with the patient.          New Prescriptions    CYCLOBENZAPRINE (FLEXERIL) 10 MG TABLET    Take 1 tablet (10 mg) by mouth 3 times daily as needed for muscle spasms    METHYLPREDNISOLONE (MEDROL DOSEPAK) 4 MG TABLET THERAPY PACK    Follow Package Directions    OXYCODONE (ROXICODONE) 5 MG TABLET    Take 1 tablet (5 mg) by mouth every 6 hours as needed for pain       Final diagnoses:   Acute right-sided low back pain with right-sided sciatica       5/28/2024   [facility] EMERGENCY DEPT       Raul Woodward MD  05/28/24 6990

## 2024-05-28 NOTE — ED TRIAGE NOTES
Pt presents with severe sciatica. Pt states started Friday . No known injury. Pt had surgery 5 months ago L5 S1. Pt unable to sit. Pt called surgeon was prescribed medrol dose pack however pharmacy did not have it.      Triage Assessment (Adult)       Row Name 05/28/24 1447          Triage Assessment    Airway WDL WDL        Respiratory WDL    Respiratory WDL WDL        Skin Circulation/Temperature WDL    Skin Circulation/Temperature WDL WDL        Cardiac WDL    Cardiac WDL WDL        Peripheral/Neurovascular WDL    Peripheral Neurovascular WDL WDL        Cognitive/Neuro/Behavioral WDL    Cognitive/Neuro/Behavioral WDL WDL

## 2024-11-04 NOTE — TELEPHONE ENCOUNTER
Preprocedure reminder call Lumbar SAULO    Arrival time 0745 am    Location: [facility] [address] [place] [address]    Do you have a ? yes    If patient doesn't have a  they will need to call and reschedule    Has it been at least 14 days before or after your last vaccination? yes    If yes, check with the nurse or provider. The procedure will most likely need to be rescheduled.    Have you had any oral steroids or antibiotics in the last five days? no    If yes check with the nurse or provider. The procedure will most likely need to be rescheduled.    To call and reschedule or talk to the nurse about any questions you may have please dial    [phone]    Pt here with Parent for pneumo 23 vaccine  Denies questions or concerns  Denies illness  No fever today    Vaccine given without difficulty by ALBINA DAVIS  Tolerated well

## 2024-12-04 ENCOUNTER — HOSPITAL ENCOUNTER (EMERGENCY)
Facility: CLINIC | Age: 44
Discharge: HOME | End: 2024-12-04
Attending: STUDENT IN AN ORGANIZED HEALTH CARE EDUCATION/TRAINING PROGRAM | Admitting: STUDENT IN AN ORGANIZED HEALTH CARE EDUCATION/TRAINING PROGRAM
Payer: COMMERCIAL

## 2024-12-04 VITALS
DIASTOLIC BLOOD PRESSURE: 88 MMHG | TEMPERATURE: 98.4 F | SYSTOLIC BLOOD PRESSURE: 128 MMHG | HEART RATE: 66 BPM | HEIGHT: 72 IN | BODY MASS INDEX: 24.38 KG/M2 | OXYGEN SATURATION: 95 % | WEIGHT: 180 LBS | RESPIRATION RATE: 18 BRPM

## 2024-12-04 DIAGNOSIS — S61.259A CAT BITE OF FINGER, INITIAL ENCOUNTER: ICD-10-CM
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
## 2024-12-04 PROCEDURE — 99283 EMERGENCY DEPT VISIT LOW MDM: CPT | Performed by: STUDENT IN AN ORGANIZED HEALTH CARE EDUCATION/TRAINING PROGRAM

## 2024-12-04 PROCEDURE — 99284 EMERGENCY DEPT VISIT MOD MDM: CPT | Performed by: STUDENT IN AN ORGANIZED HEALTH CARE EDUCATION/TRAINING PROGRAM

## 2024-12-04 RX ORDER — DOXYCYCLINE 100 MG/1
100 CAPSULE ORAL 2 TIMES DAILY
Qty: 28 CAPSULE | Refills: 0 | Status: SHIPPED | OUTPATIENT
Start: 2024-12-04 | End: 2024-12-18

## 2024-12-04 ASSESSMENT — ACTIVITIES OF DAILY LIVING (ADL): ADLS_ACUITY_SCORE: 41

## 2024-12-04 ASSESSMENT — COLUMBIA-SUICIDE SEVERITY RATING SCALE - C-SSRS
1. IN THE PAST MONTH, HAVE YOU WISHED YOU WERE DEAD OR WISHED YOU COULD GO TO SLEEP AND NOT WAKE UP?: NO
2. HAVE YOU ACTUALLY HAD ANY THOUGHTS OF KILLING YOURSELF IN THE PAST MONTH?: NO
6. HAVE YOU EVER DONE ANYTHING, STARTED TO DO ANYTHING, OR PREPARED TO DO ANYTHING TO END YOUR LIFE?: NO

## 2024-12-04 NOTE — ED TRIAGE NOTES
Pt reports he was bit by a stray kitten this morning. Right index finger. He states the kitten appears to be too young to be able to have vaccines.     Triage Assessment (Adult)       Row Name 12/04/24 0680          Triage Assessment    Airway WDL WDL        Respiratory WDL    Respiratory WDL WDL        Skin Circulation/Temperature WDL    Skin Circulation/Temperature WDL X  cat bite        Cardiac WDL    Cardiac WDL WDL        Peripheral/Neurovascular WDL    Peripheral Neurovascular WDL WDL        Cognitive/Neuro/Behavioral WDL    Cognitive/Neuro/Behavioral WDL WDL

## 2024-12-04 NOTE — DISCHARGE INSTRUCTIONS
You will need antibiotics to prevent infection from the cat bite.  Since you are allergic to penicillins, take the doxycycline as instructed for the full 2 weeks.    You will also need to quarantine the animal for 10 days and watch for any signs of rabies (aggression, restlessness, paralysis, constant salivation/foaming at the mouth).  If you see any of these signs, contact the Department of Health at the phone number provided.    Follow-up with your primary doctor.  Return to the emergency department meantime for any new or worsening symptoms.

## 2024-12-04 NOTE — Clinical Note
Reji Choi was seen and treated in our emergency department on 12/4/2024.  He may return to work on 12/05/2024.       If you have any questions or concerns, please don't hesitate to call.      Red Leal MD

## 2024-12-04 NOTE — ED PROVIDER NOTES
History     Chief Complaint   Patient presents with    Cat Bite     HPI  Reji Choi is a 44 year old male with history of hypertension, fibromyalgia, chronic pain who presents for evaluation after cat bite.  Patient was bitten by a stray cat on his right distal index finger today.  This caused tiny puncture wounds both to the anterior and posterior aspect of the radial side of the finger.  Patient was able to irrigate the wound extensively and does not feel there is any residual foreign body to the area.  Range of motion to the fingers normal.  Tetanus was last updated in 2021.  He has the animal at his home still and will plan on watching/quarantining it for 10 days to monitor for signs of rabies.  He has no other complaints today.    Allergies:  Allergies   Allergen Reactions    Ampicillin-Sulbactam Sodium Anaphylaxis    Clindamycin Other (See Comments)     Canker sores in mouth    Ketorolac Other (See Comments)     Severe agitation    Pravastatin Muscle Pain (Myalgia)     Liver stress    Rosuvastatin Muscle Pain (Myalgia)     Liver stress    Simvastatin Muscle Pain (Myalgia)     Liver stress     Problem List:    Patient Active Problem List    Diagnosis Date Noted    Acute right-sided low back pain with right-sided sciatica 10/04/2023     Priority: Medium    Controlled substance agreement signed 02/01/2018     Priority: Medium     for tramadol      Chronic neck pain 10/03/2017     Priority: Medium    Biliary dyskinesia 08/29/2017     Priority: Medium    Flying phobia 11/07/2016     Priority: Medium    Gastroesophageal reflux disease without esophagitis 07/03/2015     Priority: Medium    Allergic rhinitis 03/09/2015     Priority: Medium    Insomnia 02/03/2015     Priority: Medium    Fibromyalgia 01/02/2014     Priority: Medium    Hypertriglyceridemia 01/02/2014     Priority: Medium    Hypertension 01/02/2014     Priority: Medium    Hypovitaminosis D 01/02/2014     Priority: Medium    TRISHA on CPAP 01/02/2014      Priority: Medium      Past Medical History:    History reviewed. No pertinent past medical history.    Past Surgical History:    Past Surgical History:   Procedure Laterality Date    CHOLECYSTECTOMY       Family History:    No family history on file.    Social History:  Marital Status:   [2]  Social History     Tobacco Use    Smoking status: Former    Smokeless tobacco: Never    Tobacco comments:     eight months ago   Substance Use Topics    Alcohol use: Yes     Comment: occ    Drug use: Yes     Types: Marijuana     Comment: prescription       Medications:    doxycycline hyclate (VIBRAMYCIN) 100 MG capsule  acetaminophen (TYLENOL) 500 MG tablet  colestipol (COLESTID) 1 g tablet  desonide (DESOWEN) 0.05 % external cream  escitalopram (LEXAPRO) 20 MG tablet  ezetimibe (ZETIA) 10 MG tablet  gabapentin (NEURONTIN) 100 MG capsule  ibuprofen (ADVIL/MOTRIN) 200 MG tablet  losartan (COZAAR) 100 MG tablet  methocarbamol (ROBAXIN) 750 MG tablet  methylPREDNISolone (MEDROL DOSEPAK) 4 MG tablet therapy pack  metoprolol succinate ER (TOPROL-XL) 200 MG 24 hr tablet  omeprazole (PRILOSEC) 20 MG DR capsule  OXcarbazepine (TRILEPTAL) 150 MG tablet  predniSONE (DELTASONE) 20 MG tablet  risperiDONE (RISPERDAL) 0.5 MG tablet      Review of Systems   All other systems reviewed and are negative.  See HPI.    Physical Exam   BP: (!) 151/103  Pulse: 66  Temp: 98.4  F (36.9  C)  Resp: 18  Height: 182.9 cm (6')  Weight: 81.6 kg (180 lb)  SpO2: 99 %    Physical Exam  Vitals and nursing note reviewed.   Constitutional:       General: He is not in acute distress.     Appearance: Normal appearance. He is not ill-appearing or toxic-appearing.   HENT:      Head: Atraumatic.   Eyes:      General: No scleral icterus.     Conjunctiva/sclera: Conjunctivae normal.   Cardiovascular:      Rate and Rhythm: Normal rate and regular rhythm.      Pulses: Normal pulses.      Heart sounds: Normal heart sounds.   Pulmonary:      Effort: Pulmonary effort  is normal. No respiratory distress.      Breath sounds: Normal breath sounds.   Abdominal:      Palpations: Abdomen is soft.      Tenderness: There is no abdominal tenderness.   Musculoskeletal:         General: No tenderness or deformity. Normal range of motion.      Cervical back: Normal range of motion and neck supple.      Comments: Completely normal range of motion to the right hand/fingers.  No bony tenderness.   Skin:     General: Skin is warm.      Capillary Refill: Capillary refill takes less than 2 seconds.      Findings: Lesion present. No erythema or rash.      Comments: There are 2 tiny puncture wounds towards the radial aspect of the distal right index finger, 1 anteriorly and 1 posteriorly.  These measure about 1 mm in size each.  No evidence of foreign body.  No erythema or warmth.  Range of motion to the fingers entirely normal.  No tenderness along the flexor tendon sheath.   Neurological:      General: No focal deficit present.      Mental Status: He is alert and oriented to person, place, and time.      Sensory: No sensory deficit.      Motor: No weakness.   Psychiatric:         Mood and Affect: Mood normal.       ED Course        Procedures            No results found for this or any previous visit (from the past 24 hours).    Medications - No data to display    Assessments & Plan (with Medical Decision Making)     I have reviewed the nursing notes.    I have reviewed the findings, diagnosis, plan and need for follow up with the patient.  Medical Decision Making  Reji Choi is a 44 year old male with history of hypertension, fibromyalgia, chronic pain who presents for evaluation after cat bite.  Hypertensive on arrival, vitals otherwise normal.  Exam is significant for 2 tiny puncture wounds to the right distal index finger as described above.  No evidence for foreign body or current infection.  No nailbed involvement.  Range of motion to the finger along with neurovascular function  throughout the hand is entirely normal.  Tetanus is up-to-date.  Patient has already irrigated the wound extensively at home.  He reports a previous anaphylactic reaction to penicillins and we will therefore prescribe doxycycline for infection prophylaxis.  Patient states that he has the animal in his possession and will quarantine it to monitor for signs of rabies.  We will therefore hold off on rabies prophylaxis for now.  He was provided with contact information for the Department of Health for any questions/updates.  Patient will follow-up with his PCP as needed and agrees to return sooner for any new or worsening symptoms.    Discharge Medication List as of 12/4/2024  7:28 AM        START taking these medications    Details   doxycycline hyclate (VIBRAMYCIN) 100 MG capsule Take 1 capsule (100 mg) by mouth 2 times daily for 14 days., Disp-28 capsule, R-0, E-Prescribe           Final diagnoses:   Cat bite of finger, initial encounter     12/4/2024   [facility] EMERGENCY DEPT       Red Leal MD  12/04/24 8960       Red Leal MD  12/05/24 7887

## 2024-12-29 ENCOUNTER — HOSPITAL ENCOUNTER (EMERGENCY)
Facility: CLINIC | Age: 44
Discharge: HOME | End: 2024-12-29
Attending: STUDENT IN AN ORGANIZED HEALTH CARE EDUCATION/TRAINING PROGRAM | Admitting: STUDENT IN AN ORGANIZED HEALTH CARE EDUCATION/TRAINING PROGRAM
Payer: COMMERCIAL

## 2024-12-29 VITALS
TEMPERATURE: 98 F | BODY MASS INDEX: 25.06 KG/M2 | HEIGHT: 72 IN | OXYGEN SATURATION: 98 % | HEART RATE: 87 BPM | RESPIRATION RATE: 18 BRPM | DIASTOLIC BLOOD PRESSURE: 103 MMHG | WEIGHT: 185 LBS | SYSTOLIC BLOOD PRESSURE: 155 MMHG

## 2024-12-29 DIAGNOSIS — M54.42 ACUTE LEFT-SIDED LOW BACK PAIN WITH LEFT-SIDED SCIATICA: ICD-10-CM

## 2024-12-29 PROCEDURE — 250N000011 HC RX IP 250 OP 636: Performed by: STUDENT IN AN ORGANIZED HEALTH CARE EDUCATION/TRAINING PROGRAM

## 2024-12-29 PROCEDURE — 96372 THER/PROPH/DIAG INJ SC/IM: CPT | Performed by: STUDENT IN AN ORGANIZED HEALTH CARE EDUCATION/TRAINING PROGRAM

## 2024-12-29 PROCEDURE — 99284 EMERGENCY DEPT VISIT MOD MDM: CPT | Mod: 25 | Performed by: STUDENT IN AN ORGANIZED HEALTH CARE EDUCATION/TRAINING PROGRAM

## 2024-12-29 PROCEDURE — 99284 EMERGENCY DEPT VISIT MOD MDM: CPT | Performed by: STUDENT IN AN ORGANIZED HEALTH CARE EDUCATION/TRAINING PROGRAM

## 2024-12-29 RX ORDER — PREDNISONE 20 MG/1
TABLET ORAL
Qty: 10 TABLET | Refills: 0 | Status: SHIPPED | OUTPATIENT
Start: 2024-12-29

## 2024-12-29 RX ORDER — OXYCODONE HYDROCHLORIDE 5 MG/1
5 TABLET ORAL EVERY 6 HOURS PRN
Qty: 10 TABLET | Refills: 0 | Status: SHIPPED | OUTPATIENT
Start: 2024-12-29

## 2024-12-29 RX ADMIN — HYDROMORPHONE HYDROCHLORIDE 1 MG: 1 INJECTION, SOLUTION INTRAMUSCULAR; INTRAVENOUS; SUBCUTANEOUS at 11:47

## 2024-12-29 ASSESSMENT — COLUMBIA-SUICIDE SEVERITY RATING SCALE - C-SSRS
1. IN THE PAST MONTH, HAVE YOU WISHED YOU WERE DEAD OR WISHED YOU COULD GO TO SLEEP AND NOT WAKE UP?: NO
6. HAVE YOU EVER DONE ANYTHING, STARTED TO DO ANYTHING, OR PREPARED TO DO ANYTHING TO END YOUR LIFE?: NO
2. HAVE YOU ACTUALLY HAD ANY THOUGHTS OF KILLING YOURSELF IN THE PAST MONTH?: NO

## 2024-12-29 ASSESSMENT — ACTIVITIES OF DAILY LIVING (ADL)
ADLS_ACUITY_SCORE: 41
ADLS_ACUITY_SCORE: 41

## 2024-12-29 NOTE — ED TRIAGE NOTES
"Pt presents with concern of back pain. Pt  reports he is 5 months post of dysectomy. Pt reports \"it feels like my left leg is being tore off\". He describes his pain like his right leg felt prior to surgery. He was helping to lift half a sheet of sheet rock yesterday.         "

## 2024-12-29 NOTE — DISCHARGE INSTRUCTIONS
Your symptoms seem consistent with sciatica.    Use Tylenol and over-the-counter therapies like lidocaine patches or IcyHot.  Take the steroids as instructed until entirely gone.  Use oxycodone only as needed for breakthrough pain.    Follow-up with your primary doctor and spine specialist as needed.  Return to the emergency department in the meantime for any new or worsening symptoms, particularly any numbness in the groin, weakness of the leg, urinary or bowel incontinence.

## 2024-12-29 NOTE — Clinical Note
Reji Choi was seen and treated in our emergency department on 12/29/2024.  He may return to work on 01/01/2025.       If you have any questions or concerns, please don't hesitate to call.      Red Leal MD

## 2024-12-29 NOTE — ED PROVIDER NOTES
History     Chief Complaint   Patient presents with    Back Injury     HPI  Reji Choi is a 44 year old male with history of chronic neck pain, fairly recent lumbar discectomy who presents for evaluation of left-sided lower back pain.  Patient reports developing left lower back pain after lifting some sheet rock yesterday.  This radiates down the posterior and lateral portion of the left leg.  Symptoms feel very similar to when he had sciatica on the right side before his surgery 5 months ago.  Patient did not fall during the incident yesterday or sustain any trauma to the area.  He denies having any abdominal pain, urinary symptoms, incontinence, saddle anesthesia, focal weakness of the leg, other complaints today.    Allergies:  Allergies   Allergen Reactions    Ampicillin-Sulbactam Sodium Anaphylaxis    Clindamycin Other (See Comments)     Canker sores in mouth    Ketorolac Other (See Comments)     Severe agitation    Pravastatin Muscle Pain (Myalgia)     Liver stress    Rosuvastatin Muscle Pain (Myalgia)     Liver stress    Simvastatin Muscle Pain (Myalgia)     Liver stress     Problem List:    Patient Active Problem List    Diagnosis Date Noted    Acute right-sided low back pain with right-sided sciatica 10/04/2023     Priority: Medium    Controlled substance agreement signed 02/01/2018     Priority: Medium     for tramadol      Chronic neck pain 10/03/2017     Priority: Medium    Biliary dyskinesia 08/29/2017     Priority: Medium    Flying phobia 11/07/2016     Priority: Medium    Gastroesophageal reflux disease without esophagitis 07/03/2015     Priority: Medium    Allergic rhinitis 03/09/2015     Priority: Medium    Insomnia 02/03/2015     Priority: Medium    Fibromyalgia 01/02/2014     Priority: Medium    Hypertriglyceridemia 01/02/2014     Priority: Medium    Hypertension 01/02/2014     Priority: Medium    Hypovitaminosis D 01/02/2014     Priority: Medium    TRISHA on CPAP 01/02/2014     Priority:  Medium      Past Medical History:    No past medical history on file.    Past Surgical History:    Past Surgical History:   Procedure Laterality Date    CHOLECYSTECTOMY       Family History:    No family history on file.    Social History:  Marital Status:   [2]  Social History     Tobacco Use    Smoking status: Former    Smokeless tobacco: Never    Tobacco comments:     eight months ago   Substance Use Topics    Alcohol use: Yes     Comment: occ    Drug use: Yes     Types: Marijuana     Comment: prescription       Medications:    oxyCODONE (ROXICODONE) 5 MG tablet  predniSONE (DELTASONE) 20 MG tablet  acetaminophen (TYLENOL) 500 MG tablet  colestipol (COLESTID) 1 g tablet  desonide (DESOWEN) 0.05 % external cream  escitalopram (LEXAPRO) 20 MG tablet  ezetimibe (ZETIA) 10 MG tablet  gabapentin (NEURONTIN) 100 MG capsule  ibuprofen (ADVIL/MOTRIN) 200 MG tablet  losartan (COZAAR) 100 MG tablet  methocarbamol (ROBAXIN) 750 MG tablet  methylPREDNISolone (MEDROL DOSEPAK) 4 MG tablet therapy pack  metoprolol succinate ER (TOPROL-XL) 200 MG 24 hr tablet  omeprazole (PRILOSEC) 20 MG DR capsule  OXcarbazepine (TRILEPTAL) 150 MG tablet  risperiDONE (RISPERDAL) 0.5 MG tablet      Review of Systems   All other systems reviewed and are negative.  See HPI.    Physical Exam   BP: (!) 155/103  Pulse: 87  Temp: 98  F (36.7  C)  Resp: 18  Height: 182.9 cm (6')  Weight: 83.9 kg (185 lb)  SpO2: 98 %    Physical Exam  Vitals and nursing note reviewed.   Constitutional:       General: He is in acute distress.      Appearance: Normal appearance. He is not ill-appearing or toxic-appearing.      Comments: Uncomfortable due to back pain.  Nontoxic.   HENT:      Head: Atraumatic.      Mouth/Throat:      Mouth: Mucous membranes are moist.      Pharynx: Oropharynx is clear.   Eyes:      General: No scleral icterus.     Extraocular Movements: Extraocular movements intact.      Conjunctiva/sclera: Conjunctivae normal.      Pupils: Pupils  are equal, round, and reactive to light.   Cardiovascular:      Rate and Rhythm: Normal rate and regular rhythm.      Pulses: Normal pulses.      Heart sounds: Normal heart sounds.      Comments: Pulses equal in all extremities.  Pulmonary:      Effort: Pulmonary effort is normal. No respiratory distress.      Breath sounds: Normal breath sounds.   Abdominal:      Palpations: Abdomen is soft.      Tenderness: There is no abdominal tenderness. There is no right CVA tenderness, left CVA tenderness, guarding or rebound.   Musculoskeletal:         General: Tenderness present. No deformity or signs of injury.      Cervical back: Normal range of motion and neck supple.      Comments: Tenderness to the left SI region.  No midline spinal tenderness.  No tenderness to the hip or pelvis either.  Negative SLR.   Skin:     General: Skin is warm.      Capillary Refill: Capillary refill takes less than 2 seconds.      Coloration: Skin is not pale.      Findings: No erythema.   Neurological:      General: No focal deficit present.      Mental Status: He is alert and oriented to person, place, and time.      Sensory: No sensory deficit.      Motor: No weakness.      Coordination: Coordination normal.      Gait: Gait normal.      Comments: Normal strength and sensation to both legs, negative SLR.   Psychiatric:         Mood and Affect: Mood normal.       ED Course        Procedures            No results found for this or any previous visit (from the past 24 hours).    Medications   HYDROmorphone (DILAUDID) injection 1 mg (1 mg Intramuscular $Given 12/29/24 2485)       Assessments & Plan (with Medical Decision Making)     I have reviewed the nursing notes.    I have reviewed the findings, diagnosis, plan and need for follow up with the patient.  Medical Decision Making  Reji Choi is a 44 year old male with history of chronic neck pain, fairly recent lumbar discectomy who presents for evaluation of left-sided lower back pain.   Hypertensive on arrival, vitals otherwise normal.  Patient did look uncomfortable due to left lower back pain but nontoxic.  Exam was most significant for some reproducible tenderness near the left SI region.  Otherwise strength, sensation, pulses are intact distally down the leg.  Abdomen is nontender and he has no CVA tenderness.  Presentation seems consistent with sciatica.  Low suspicion for other causes like kidney stones based on presenting symptoms and exam.  The pain started atraumatically and I do not think x-ray or CT would provide any relevant insight.  No red flag symptoms to warrant emergent MRI either.  He had significant improvement of pain with IM Dilaudid.  We will discharge home with some steroids and anti-inflammatory medication over-the-counter.  Short course of oxycodone provided as well, no other narcotics on PDMP query recently.  Patient will follow-up with his PCP and spine specialist as needed depending on symptoms after treatment.  He agrees to return sooner for any new or acutely worsening symptoms.    Discharge Medication List as of 12/29/2024 12:13 PM        START taking these medications    Details   oxyCODONE (ROXICODONE) 5 MG tablet Take 1 tablet (5 mg) by mouth every 6 hours as needed., Disp-10 tablet, R-0, E-Prescribe           Final diagnoses:   Acute left-sided low back pain with left-sided sciatica     12/29/2024   [facility] EMERGENCY DEPT       Red Leal MD  12/29/24 1301

## 2025-05-10 ENCOUNTER — HEALTH MAINTENANCE LETTER (OUTPATIENT)
Age: 45
End: 2025-05-10

## 2025-08-30 ENCOUNTER — HOSPITAL ENCOUNTER (EMERGENCY)
Facility: CLINIC | Age: 45
Discharge: HOME | End: 2025-08-30
Attending: EMERGENCY MEDICINE | Admitting: EMERGENCY MEDICINE
Payer: COMMERCIAL

## 2025-08-30 VITALS
HEIGHT: 72 IN | OXYGEN SATURATION: 97 % | RESPIRATION RATE: 20 BRPM | DIASTOLIC BLOOD PRESSURE: 92 MMHG | HEART RATE: 70 BPM | TEMPERATURE: 98.8 F | BODY MASS INDEX: 24.33 KG/M2 | SYSTOLIC BLOOD PRESSURE: 163 MMHG | WEIGHT: 179.6 LBS

## 2025-08-30 DIAGNOSIS — R10.11 RIGHT UPPER QUADRANT ABDOMINAL PAIN: Primary | ICD-10-CM

## 2025-08-30 LAB
ALBUMIN SERPL BCG-MCNC: 4.4 G/DL (ref 3.5–5.2)
ALP SERPL-CCNC: 54 U/L (ref 40–150)
ALT SERPL W P-5'-P-CCNC: 25 U/L (ref 0–70)
ANION GAP SERPL CALCULATED.3IONS-SCNC: 15 MMOL/L (ref 7–15)
AST SERPL W P-5'-P-CCNC: 19 U/L (ref 0–45)
BASOPHILS # BLD AUTO: 0.06 10E3/UL (ref 0–0.2)
BASOPHILS NFR BLD AUTO: 0.6 %
BILIRUB SERPL-MCNC: 0.2 MG/DL
BUN SERPL-MCNC: 10.6 MG/DL (ref 6–20)
CALCIUM SERPL-MCNC: 9.4 MG/DL (ref 8.8–10.4)
CHLORIDE SERPL-SCNC: 99 MMOL/L (ref 98–107)
CREAT SERPL-MCNC: 0.78 MG/DL (ref 0.67–1.17)
EGFRCR SERPLBLD CKD-EPI 2021: >90 ML/MIN/1.73M2
EOSINOPHIL # BLD AUTO: 0.47 10E3/UL (ref 0–0.7)
EOSINOPHIL NFR BLD AUTO: 4.8 %
ERYTHROCYTE [DISTWIDTH] IN BLOOD BY AUTOMATED COUNT: 12.4 % (ref 10–15)
GLUCOSE SERPL-MCNC: 98 MG/DL (ref 70–99)
HCO3 SERPL-SCNC: 23 MMOL/L (ref 22–29)
HCT VFR BLD AUTO: 33.7 % (ref 40–53)
HGB BLD-MCNC: 12.1 G/DL (ref 13.3–17.7)
IMM GRANULOCYTES # BLD: 0.03 10E3/UL
IMM GRANULOCYTES NFR BLD: 0.3 %
LIPASE SERPL-CCNC: 48 U/L (ref 13–60)
LYMPHOCYTES # BLD AUTO: 2.7 10E3/UL (ref 0.8–5.3)
LYMPHOCYTES NFR BLD AUTO: 27.5 %
MCH RBC QN AUTO: 31.8 PG (ref 26.5–33)
MCHC RBC AUTO-ENTMCNC: 35.9 G/DL (ref 31.5–36.5)
MCV RBC AUTO: 88.5 FL (ref 78–100)
MONOCYTES # BLD AUTO: 0.72 10E3/UL (ref 0–1.3)
MONOCYTES NFR BLD AUTO: 7.3 %
NEUTROPHILS # BLD AUTO: 5.83 10E3/UL (ref 1.6–8.3)
NEUTROPHILS NFR BLD AUTO: 59.5 %
NRBC # BLD AUTO: <0.03 10E3/UL
NRBC BLD AUTO-RTO: 0 /100
PLATELET # BLD AUTO: 294 10E3/UL (ref 150–450)
POTASSIUM SERPL-SCNC: 3.7 MMOL/L (ref 3.4–5.3)
PROT SERPL-MCNC: 6.9 G/DL (ref 6.4–8.3)
RBC # BLD AUTO: 3.81 10E6/UL (ref 4.4–5.9)
SODIUM SERPL-SCNC: 137 MMOL/L (ref 135–145)
WBC # BLD AUTO: 9.81 10E3/UL (ref 4–11)

## 2025-08-30 PROCEDURE — 99284 EMERGENCY DEPT VISIT MOD MDM: CPT | Performed by: EMERGENCY MEDICINE

## 2025-08-30 PROCEDURE — 96361 HYDRATE IV INFUSION ADD-ON: CPT

## 2025-08-30 PROCEDURE — 82247 BILIRUBIN TOTAL: CPT | Performed by: EMERGENCY MEDICINE

## 2025-08-30 PROCEDURE — 83690 ASSAY OF LIPASE: CPT | Performed by: EMERGENCY MEDICINE

## 2025-08-30 PROCEDURE — 258N000003 HC RX IP 258 OP 636: Performed by: EMERGENCY MEDICINE

## 2025-08-30 PROCEDURE — 250N000011 HC RX IP 250 OP 636: Performed by: EMERGENCY MEDICINE

## 2025-08-30 PROCEDURE — 36415 COLL VENOUS BLD VENIPUNCTURE: CPT | Performed by: EMERGENCY MEDICINE

## 2025-08-30 PROCEDURE — 96374 THER/PROPH/DIAG INJ IV PUSH: CPT

## 2025-08-30 PROCEDURE — 99284 EMERGENCY DEPT VISIT MOD MDM: CPT | Mod: 25 | Performed by: EMERGENCY MEDICINE

## 2025-08-30 PROCEDURE — 85018 HEMOGLOBIN: CPT | Performed by: EMERGENCY MEDICINE

## 2025-08-30 PROCEDURE — 96375 TX/PRO/DX INJ NEW DRUG ADDON: CPT

## 2025-08-30 RX ORDER — ONDANSETRON 4 MG/1
4 TABLET, ORALLY DISINTEGRATING ORAL EVERY 8 HOURS PRN
Qty: 10 TABLET | Refills: 0 | Status: SHIPPED | OUTPATIENT
Start: 2025-08-30

## 2025-08-30 RX ORDER — HYDROMORPHONE HYDROCHLORIDE 1 MG/ML
0.5 INJECTION, SOLUTION INTRAMUSCULAR; INTRAVENOUS; SUBCUTANEOUS EVERY 30 MIN PRN
Refills: 0 | Status: DISCONTINUED | OUTPATIENT
Start: 2025-08-30 | End: 2025-08-30 | Stop reason: HOSPADM

## 2025-08-30 RX ORDER — ONDANSETRON 2 MG/ML
4 INJECTION INTRAMUSCULAR; INTRAVENOUS EVERY 30 MIN PRN
Status: DISCONTINUED | OUTPATIENT
Start: 2025-08-30 | End: 2025-08-30 | Stop reason: HOSPADM

## 2025-08-30 RX ORDER — HYDROCODONE BITARTRATE AND ACETAMINOPHEN 5; 325 MG/1; MG/1
1 TABLET ORAL EVERY 6 HOURS PRN
Qty: 12 TABLET | Refills: 0 | Status: SHIPPED | OUTPATIENT
Start: 2025-08-30

## 2025-08-30 RX ADMIN — SODIUM CHLORIDE 1000 ML: 0.9 INJECTION, SOLUTION INTRAVENOUS at 18:25

## 2025-08-30 RX ADMIN — HYDROMORPHONE HYDROCHLORIDE 0.5 MG: 1 INJECTION, SOLUTION INTRAMUSCULAR; INTRAVENOUS; SUBCUTANEOUS at 18:27

## 2025-08-30 RX ADMIN — ONDANSETRON 4 MG: 2 INJECTION, SOLUTION INTRAMUSCULAR; INTRAVENOUS at 18:26

## 2025-08-30 ASSESSMENT — ACTIVITIES OF DAILY LIVING (ADL)
ADLS_ACUITY_SCORE: 41
ADLS_ACUITY_SCORE: 41

## 2025-09-04 ENCOUNTER — HOSPITAL ENCOUNTER (EMERGENCY)
Facility: CLINIC | Age: 45
Discharge: HOME | End: 2025-09-04
Attending: EMERGENCY MEDICINE
Payer: COMMERCIAL

## 2025-09-04 VITALS
RESPIRATION RATE: 20 BRPM | BODY MASS INDEX: 24.11 KG/M2 | HEIGHT: 72 IN | WEIGHT: 178 LBS | SYSTOLIC BLOOD PRESSURE: 125 MMHG | HEART RATE: 52 BPM | TEMPERATURE: 98.1 F | DIASTOLIC BLOOD PRESSURE: 92 MMHG | OXYGEN SATURATION: 96 %

## 2025-09-04 DIAGNOSIS — Z90.49 HISTORY OF LAPAROSCOPIC CHOLECYSTECTOMY: Primary | ICD-10-CM

## 2025-09-04 DIAGNOSIS — G89.29 CHRONIC ABDOMINAL PAIN: ICD-10-CM

## 2025-09-04 DIAGNOSIS — R19.7 DIARRHEA, UNSPECIFIED TYPE: ICD-10-CM

## 2025-09-04 DIAGNOSIS — R10.9 CHRONIC ABDOMINAL PAIN: ICD-10-CM

## 2025-09-04 LAB
ALBUMIN SERPL BCG-MCNC: 4.7 G/DL (ref 3.5–5.2)
ALP SERPL-CCNC: 58 U/L (ref 40–150)
ALT SERPL W P-5'-P-CCNC: 23 U/L (ref 0–70)
ANION GAP SERPL CALCULATED.3IONS-SCNC: 14 MMOL/L (ref 7–15)
AST SERPL W P-5'-P-CCNC: 20 U/L (ref 0–45)
BASOPHILS # BLD AUTO: 0.07 10E3/UL (ref 0–0.2)
BASOPHILS NFR BLD AUTO: 0.9 %
BILIRUB SERPL-MCNC: 0.3 MG/DL
BUN SERPL-MCNC: 11.7 MG/DL (ref 6–20)
CALCIUM SERPL-MCNC: 9.9 MG/DL (ref 8.8–10.4)
CHLORIDE SERPL-SCNC: 100 MMOL/L (ref 98–107)
CREAT SERPL-MCNC: 0.76 MG/DL (ref 0.67–1.17)
EGFRCR SERPLBLD CKD-EPI 2021: >90 ML/MIN/1.73M2
EOSINOPHIL # BLD AUTO: 0.54 10E3/UL (ref 0–0.7)
EOSINOPHIL NFR BLD AUTO: 7.1 %
ERYTHROCYTE [DISTWIDTH] IN BLOOD BY AUTOMATED COUNT: 12.3 % (ref 10–15)
GLUCOSE SERPL-MCNC: 120 MG/DL (ref 70–99)
HCO3 SERPL-SCNC: 23 MMOL/L (ref 22–29)
HCT VFR BLD AUTO: 38.9 % (ref 40–53)
HGB BLD-MCNC: 13.8 G/DL (ref 13.3–17.7)
IMM GRANULOCYTES # BLD: 0.03 10E3/UL
IMM GRANULOCYTES NFR BLD: 0.4 %
LIPASE SERPL-CCNC: 68 U/L (ref 13–60)
LYMPHOCYTES # BLD AUTO: 1.97 10E3/UL (ref 0.8–5.3)
LYMPHOCYTES NFR BLD AUTO: 26 %
MCH RBC QN AUTO: 31.4 PG (ref 26.5–33)
MCHC RBC AUTO-ENTMCNC: 35.5 G/DL (ref 31.5–36.5)
MCV RBC AUTO: 88.6 FL (ref 78–100)
MONOCYTES # BLD AUTO: 0.51 10E3/UL (ref 0–1.3)
MONOCYTES NFR BLD AUTO: 6.7 %
NEUTROPHILS # BLD AUTO: 4.47 10E3/UL (ref 1.6–8.3)
NEUTROPHILS NFR BLD AUTO: 58.9 %
NRBC # BLD AUTO: <0.03 10E3/UL
NRBC BLD AUTO-RTO: 0 /100
PLATELET # BLD AUTO: 330 10E3/UL (ref 150–450)
POTASSIUM SERPL-SCNC: 4.4 MMOL/L (ref 3.4–5.3)
PROT SERPL-MCNC: 7.5 G/DL (ref 6.4–8.3)
RBC # BLD AUTO: 4.39 10E6/UL (ref 4.4–5.9)
SODIUM SERPL-SCNC: 137 MMOL/L (ref 135–145)
WBC # BLD AUTO: 7.59 10E3/UL (ref 4–11)

## 2025-09-04 PROCEDURE — 36415 COLL VENOUS BLD VENIPUNCTURE: CPT | Performed by: EMERGENCY MEDICINE

## 2025-09-04 PROCEDURE — 250N000011 HC RX IP 250 OP 636: Performed by: EMERGENCY MEDICINE

## 2025-09-04 PROCEDURE — 258N000003 HC RX IP 258 OP 636: Performed by: EMERGENCY MEDICINE

## 2025-09-04 PROCEDURE — 85004 AUTOMATED DIFF WBC COUNT: CPT | Performed by: EMERGENCY MEDICINE

## 2025-09-04 PROCEDURE — 84520 ASSAY OF UREA NITROGEN: CPT | Performed by: EMERGENCY MEDICINE

## 2025-09-04 PROCEDURE — 83690 ASSAY OF LIPASE: CPT | Performed by: EMERGENCY MEDICINE

## 2025-09-04 RX ORDER — ONDANSETRON 2 MG/ML
4 INJECTION INTRAMUSCULAR; INTRAVENOUS EVERY 30 MIN PRN
Status: DISCONTINUED | OUTPATIENT
Start: 2025-09-04 | End: 2025-09-04 | Stop reason: HOSPADM

## 2025-09-04 RX ORDER — HYDROMORPHONE HYDROCHLORIDE 1 MG/ML
0.5 INJECTION, SOLUTION INTRAMUSCULAR; INTRAVENOUS; SUBCUTANEOUS EVERY 30 MIN PRN
Status: DISCONTINUED | OUTPATIENT
Start: 2025-09-04 | End: 2025-09-04 | Stop reason: HOSPADM

## 2025-09-04 RX ORDER — OXYCODONE HYDROCHLORIDE 5 MG/1
5 TABLET ORAL EVERY 8 HOURS PRN
Qty: 15 TABLET | Refills: 0 | Status: SHIPPED | OUTPATIENT
Start: 2025-09-04

## 2025-09-04 RX ADMIN — ONDANSETRON 4 MG: 2 INJECTION, SOLUTION INTRAMUSCULAR; INTRAVENOUS at 08:00

## 2025-09-04 RX ADMIN — HYDROMORPHONE HYDROCHLORIDE 1 MG: 1 INJECTION, SOLUTION INTRAMUSCULAR; INTRAVENOUS; SUBCUTANEOUS at 08:03

## 2025-09-04 RX ADMIN — SODIUM CHLORIDE 1000 ML: 0.9 INJECTION, SOLUTION INTRAVENOUS at 07:57

## 2025-09-04 ASSESSMENT — ACTIVITIES OF DAILY LIVING (ADL)
ADLS_ACUITY_SCORE: 45
ADLS_ACUITY_SCORE: 41
